# Patient Record
Sex: MALE | Race: WHITE | NOT HISPANIC OR LATINO | Employment: FULL TIME | URBAN - METROPOLITAN AREA
[De-identification: names, ages, dates, MRNs, and addresses within clinical notes are randomized per-mention and may not be internally consistent; named-entity substitution may affect disease eponyms.]

---

## 2017-01-25 ENCOUNTER — TELEPHONE (OUTPATIENT)
Dept: MEDICAL GROUP | Facility: PHYSICIAN GROUP | Age: 47
End: 2017-01-25

## 2017-01-25 NOTE — TELEPHONE ENCOUNTER
Pt would like to get this medication refilled I told him about having to get labs done and he said he did with work and said he got them done at [facility]. Also has a problem with an allergy med. Please call him

## 2017-01-26 NOTE — TELEPHONE ENCOUNTER
Phone Number Called: [phone]     Message: Left message for patient to call us back about his medication.     Left Message for patient to call back: yes and no

## 2017-02-27 ENCOUNTER — OFFICE VISIT (OUTPATIENT)
Dept: MEDICAL GROUP | Facility: MEDICAL CENTER | Age: 47
End: 2017-02-27
Payer: COMMERCIAL

## 2017-02-27 VITALS
HEIGHT: 74 IN | DIASTOLIC BLOOD PRESSURE: 89 MMHG | TEMPERATURE: 98.2 F | WEIGHT: 198 LBS | BODY MASS INDEX: 25.41 KG/M2 | OXYGEN SATURATION: 98 % | SYSTOLIC BLOOD PRESSURE: 134 MMHG | HEART RATE: 89 BPM | RESPIRATION RATE: 16 BRPM

## 2017-02-27 DIAGNOSIS — E78.5 HYPERLIPIDEMIA, UNSPECIFIED HYPERLIPIDEMIA TYPE: ICD-10-CM

## 2017-02-27 DIAGNOSIS — J30.89 ALLERGIC RHINITIS DUE TO OTHER ALLERGIC TRIGGER, UNSPECIFIED RHINITIS SEASONALITY: ICD-10-CM

## 2017-02-27 DIAGNOSIS — Z23 NEED FOR PERTUSSIS VACCINATION: ICD-10-CM

## 2017-02-27 PROBLEM — J30.9 ALLERGIC RHINITIS DUE TO ALLERGEN: Status: ACTIVE | Noted: 2017-02-27

## 2017-02-27 PROCEDURE — 90715 TDAP VACCINE 7 YRS/> IM: CPT | Performed by: PHYSICIAN ASSISTANT

## 2017-02-27 PROCEDURE — 90471 IMMUNIZATION ADMIN: CPT | Performed by: PHYSICIAN ASSISTANT

## 2017-02-27 PROCEDURE — 99214 OFFICE O/P EST MOD 30 MIN: CPT | Mod: 25 | Performed by: PHYSICIAN ASSISTANT

## 2017-02-27 RX ORDER — SIMVASTATIN 10 MG
TABLET ORAL
Qty: 90 TAB | Refills: 1 | Status: SHIPPED | OUTPATIENT
Start: 2017-02-27 | End: 2017-02-27 | Stop reason: SDUPTHER

## 2017-02-27 RX ORDER — TRIAMCINOLONE ACETONIDE 40 MG/ML
40 INJECTION, SUSPENSION INTRA-ARTICULAR; INTRAMUSCULAR ONCE
Qty: 1 ML | Refills: 0 | Status: SHIPPED | OUTPATIENT
Start: 2017-02-27 | End: 2017-02-28 | Stop reason: SDUPTHER

## 2017-02-27 RX ORDER — SIMVASTATIN 10 MG
TABLET ORAL
Qty: 90 TAB | Refills: 1 | Status: SHIPPED | OUTPATIENT
Start: 2017-02-27 | End: 2017-02-28 | Stop reason: SDUPTHER

## 2017-02-27 RX ORDER — FEXOFENADINE HCL 180 MG/1
180 TABLET ORAL DAILY
Qty: 90 TAB | Refills: 1 | Status: SHIPPED | OUTPATIENT
Start: 2017-02-27 | End: 2022-01-17

## 2017-02-27 ASSESSMENT — PATIENT HEALTH QUESTIONNAIRE - PHQ9: CLINICAL INTERPRETATION OF PHQ2 SCORE: 0

## 2017-02-27 NOTE — PROGRESS NOTES
Chief Complaint   Patient presents with   • Follow-Up       HISTORY OF PRESENT ILLNESS: Patient is a 46 y.o. male established patient who presents today to follow-up    Hyperlipidemia  Chronic in nature. Patient currently taking simvastatin 10 mg daily basis. Patient recently had labs drawn in December 2016 for his place of employment. Patient states that labs are significantly better I have reviewed labs with patient today which does show improved LDL, HDL, triglycerides and total clitoral. Patient very impressed with lab results, which is down medication. States no adverse side effects to medication.    Allergic rhinitis due to allergen  Chronic in nature. Patient states was doing well on Claritin 10 mg daily. Patient states build-up tolerance. Requesting new medication. In discussion with patient and patient requesting Kenalog injection. Patient states he wishes to self administer. Patient states he will return to clinic if having troubles administering medication.    Need for pertussis vaccination  Requesting Tdap vaccination. Patient states he was expecting twins in the near future and does need such injection prior to twins being born. Patient states asymptomatic currently and no content indications when patient cannot receive vaccination in office.       Patient Active Problem List    Diagnosis Date Noted   • Hyperlipidemia 02/27/2017   • Allergic rhinitis due to allergen 02/27/2017   • Need for pertussis vaccination 02/27/2017       Allergies:Review of patient's allergies indicates no known allergies.    Current Outpatient Prescriptions   Medication Sig Dispense Refill   • simvastatin (ZOCOR) 10 MG Tab TAKE 1 TAB BY MOUTH EVERY EVENING. 90 Tab 1   • fexofenadine (ALLEGRA) 180 MG tablet Take 1 Tab by mouth every day. 90 Tab 1   • triamcinolone acetonide (KENALOG-40) 40 MG/ML Suspension 1 mL by Intramuscular route Once for 1 dose. 1 mL 0   • loratadine (CLARITIN) 10 MG Tab TAKE 1 TAB BY MOUTH EVERY DAY. **INS.  "REJECTED 90 DAYS 30 Tab 5   • fluticasone (FLONASE) 50 MCG/ACT nasal spray Spray 1 Spray in nose 2 times a day. 16 g 2     No current facility-administered medications for this visit.       Social History   Substance Use Topics   • Smoking status: Current Every Day Smoker -- 0.25 packs/day     Types: Cigars   • Smokeless tobacco: Never Used   • Alcohol Use: No       Family Status   Relation Status Death Age   • Mother       Cancer     Family History   Problem Relation Age of Onset   • Cancer Mother      unknown area   • Heart Disease Father        Review of Systems:   Constitutional: Negative for fever, chills, weight loss and malaise/fatigue.   HENT: Negative for ear pain, nosebleeds.  Positive nasal congestion.  Negative sore throat and neck pain.    Eyes: Negative for blurred vision.   Respiratory: Negative for cough, sputum production, shortness of breath and wheezing.    Cardiovascular: Negative for chest pain, palpitations, orthopnea and leg swelling.   Gastrointestinal: Negative for heartburn, nausea, vomiting and abdominal pain.   Genitourinary: Negative for dysuria, urgency and frequency.   Musculoskeletal: Negative for myalgias, back pain and joint pain.   Skin: Negative for rash and itching.   Neurological: Negative for dizziness, tingling, tremors, sensory change, focal weakness and headaches.   Endo/Heme/Allergies: Does not bruise/bleed easily.   Psychiatric/Behavioral: Negative for depression, suicidal ideas and memory loss.  The patient is not nervous/anxious and does not have insomnia.    All other systems reviewed and are negative except as in HPI.    Exam:  Blood pressure 134/89, pulse 89, temperature 36.8 °C (98.2 °F), resp. rate 16, height 1.88 m (6' 2.02\"), weight 89.812 kg (198 lb), SpO2 98 %.  General:  Well nourished, well developed male in NAD  Head: is grossly normal.  Neck: Supple   Pulmonary: No respiratory distress  Extremities: no clubbing, cyanosis, or edema.    Medical " decision-making and discussion: I reviewed the patient's labs that he does bring in today. Show improvement. We will continue on simvastatin 10 mg. 90 day supply with one additional refill has been ordered. We have changed out patient's Claritin to Allegra. Also written for patient triamcinolone intramuscular injection.    Please note that this dictation was created using voice recognition software. I have made every reasonable attempt to correct obvious errors, but I expect that there are errors of grammar and possibly content that I did not discover before finalizing the note.    Assessment/Plan:  1. Hyperlipidemia, unspecified hyperlipidemia type  simvastatin (ZOCOR) 10 MG Tab    DISCONTINUED: simvastatin (ZOCOR) 10 MG Tab    improved.  stay on current dose   2. Need for pertussis vaccination  TDAP VACCINE =>6YO IM   3. Allergic rhinitis due to other allergic trigger, unspecified rhinitis seasonality  fexofenadine (ALLEGRA) 180 MG tablet    triamcinolone acetonide (KENALOG-40) 40 MG/ML Suspension

## 2017-02-27 NOTE — ASSESSMENT & PLAN NOTE
Requesting Tdap vaccination. Patient states he was expecting twins in the near future and does need such injection prior to twins being born. Patient states asymptomatic currently and no content indications when patient cannot receive vaccination in office.

## 2017-02-27 NOTE — ASSESSMENT & PLAN NOTE
Chronic in nature. Patient currently taking simvastatin 10 mg daily basis. Patient recently had labs drawn in December 2016 for his place of employment. Patient states that labs are significantly better I have reviewed labs with patient today which does show improved LDL, HDL, triglycerides and total clitoral. Patient very impressed with lab results, which is down medication. States no adverse side effects to medication.

## 2017-02-27 NOTE — ASSESSMENT & PLAN NOTE
Chronic in nature. Patient states was doing well on Claritin 10 mg daily. Patient states build-up tolerance. Requesting new medication. In discussion with patient and patient requesting Kenalog injection. Patient states he wishes to self administer. Patient states he will return to clinic if having troubles administering medication.

## 2017-02-28 ENCOUNTER — TELEPHONE (OUTPATIENT)
Dept: MEDICAL GROUP | Facility: MEDICAL CENTER | Age: 47
End: 2017-02-28

## 2017-02-28 DIAGNOSIS — E78.5 HYPERLIPIDEMIA, UNSPECIFIED HYPERLIPIDEMIA TYPE: ICD-10-CM

## 2017-02-28 DIAGNOSIS — J30.89 ALLERGIC RHINITIS DUE TO OTHER ALLERGIC TRIGGER, UNSPECIFIED RHINITIS SEASONALITY: ICD-10-CM

## 2017-02-28 RX ORDER — SIMVASTATIN 10 MG
TABLET ORAL
Qty: 90 TAB | Refills: 1 | Status: SHIPPED | OUTPATIENT
Start: 2017-02-28 | End: 2017-09-12 | Stop reason: SDUPTHER

## 2017-02-28 RX ORDER — TRIAMCINOLONE ACETONIDE 40 MG/ML
40 INJECTION, SUSPENSION INTRA-ARTICULAR; INTRAMUSCULAR ONCE
Qty: 1 ML | Refills: 0 | Status: SHIPPED | OUTPATIENT
Start: 2017-02-28 | End: 2017-02-28

## 2017-02-28 NOTE — TELEPHONE ENCOUNTER
1. Caller Name: Sebastian                      Call Back Number: [phone]     2. Message: Sebastian called stating that his simvastatin (ZOCOR) 10 MG Tab cost $50.00 a month and that is to much money for him to spend. He would like if you could give him a different medication that does not cost as much.     Please let me know and I will call Sebastian back.     Thank you.     3. Patient approves office to leave a detailed voicemail/MyChart message: yes

## 2017-06-29 DIAGNOSIS — J30.2 SEASONAL ALLERGIC RHINITIS, UNSPECIFIED ALLERGIC RHINITIS TRIGGER: ICD-10-CM

## 2017-06-30 NOTE — TELEPHONE ENCOUNTER
Was the patient seen in the last year in this department? Yes     Does patient have an active prescription for medications requested? No     Received Request Via: Pharmacy     Last seen 02/27/2017  Labs  03/21/2016

## 2017-07-03 RX ORDER — LORATADINE 10 MG/1
TABLET ORAL
Qty: 30 TAB | Refills: 5 | Status: SHIPPED | OUTPATIENT
Start: 2017-07-03 | End: 2018-01-07 | Stop reason: SDUPTHER

## 2017-07-11 RX ORDER — FLUTICASONE PROPIONATE 50 MCG
SPRAY, SUSPENSION (ML) NASAL
Qty: 1 BOTTLE | Refills: 2 | Status: SHIPPED | OUTPATIENT
Start: 2017-07-11 | End: 2022-01-17

## 2017-07-11 NOTE — TELEPHONE ENCOUNTER
Was the patient seen in the last year in this department? Yes     Does patient have an active prescription for medications requested? Yes     Received Request Via: Pharmacy     Last office visit: 03/16/2016  Last labs: 02/27/2017

## 2017-09-12 DIAGNOSIS — E78.5 HYPERLIPIDEMIA, UNSPECIFIED HYPERLIPIDEMIA TYPE: ICD-10-CM

## 2017-09-12 RX ORDER — SIMVASTATIN 10 MG
TABLET ORAL
Qty: 90 TAB | Refills: 0 | Status: SHIPPED | OUTPATIENT
Start: 2017-09-12 | End: 2018-03-17 | Stop reason: SDUPTHER

## 2017-10-16 ENCOUNTER — APPOINTMENT (OUTPATIENT)
Dept: SOCIAL WORK | Facility: CLINIC | Age: 47
End: 2017-10-16
Payer: COMMERCIAL

## 2017-10-16 PROCEDURE — 90471 IMMUNIZATION ADMIN: CPT | Performed by: REGISTERED NURSE

## 2017-10-16 PROCEDURE — 90686 IIV4 VACC NO PRSV 0.5 ML IM: CPT | Performed by: REGISTERED NURSE

## 2018-01-07 DIAGNOSIS — J30.2 SEASONAL ALLERGIC RHINITIS: ICD-10-CM

## 2018-01-08 RX ORDER — LORATADINE 10 MG/1
TABLET ORAL
Qty: 90 TAB | Refills: 0 | Status: SHIPPED | OUTPATIENT
Start: 2018-01-08 | End: 2022-01-17

## 2018-03-17 DIAGNOSIS — E78.5 HYPERLIPIDEMIA, UNSPECIFIED HYPERLIPIDEMIA TYPE: ICD-10-CM

## 2018-03-22 RX ORDER — SIMVASTATIN 10 MG
TABLET ORAL
Qty: 90 TAB | Refills: 0 | Status: SHIPPED | OUTPATIENT
Start: 2018-03-22 | End: 2018-09-17 | Stop reason: SDUPTHER

## 2018-09-17 ENCOUNTER — OFFICE VISIT (OUTPATIENT)
Dept: MEDICAL GROUP | Facility: PHYSICIAN GROUP | Age: 48
End: 2018-09-17
Payer: COMMERCIAL

## 2018-09-17 VITALS
WEIGHT: 195 LBS | RESPIRATION RATE: 14 BRPM | OXYGEN SATURATION: 94 % | HEART RATE: 83 BPM | TEMPERATURE: 97.3 F | DIASTOLIC BLOOD PRESSURE: 60 MMHG | BODY MASS INDEX: 25.03 KG/M2 | SYSTOLIC BLOOD PRESSURE: 104 MMHG | HEIGHT: 74 IN

## 2018-09-17 DIAGNOSIS — E78.5 HYPERLIPIDEMIA, UNSPECIFIED HYPERLIPIDEMIA TYPE: ICD-10-CM

## 2018-09-17 PROCEDURE — 99214 OFFICE O/P EST MOD 30 MIN: CPT | Performed by: FAMILY MEDICINE

## 2018-09-17 RX ORDER — SIMVASTATIN 10 MG
10 TABLET ORAL
Qty: 90 TAB | Refills: 3 | Status: SHIPPED | OUTPATIENT
Start: 2018-09-17 | End: 2019-09-16 | Stop reason: SDUPTHER

## 2018-09-17 RX ORDER — SIMVASTATIN 10 MG
10 TABLET ORAL
Qty: 90 TAB | Refills: 1 | Status: CANCELLED | OUTPATIENT
Start: 2018-09-17

## 2018-09-17 ASSESSMENT — ENCOUNTER SYMPTOMS
PALPITATIONS: 0
CONSTITUTIONAL NEGATIVE: 1
MYALGIAS: 0
DIZZINESS: 0
EYES NEGATIVE: 1
COUGH: 0
CHILLS: 0
MUSCULOSKELETAL NEGATIVE: 1
HEARTBURN: 0
DEPRESSION: 0
NEUROLOGICAL NEGATIVE: 1
BRUISES/BLEEDS EASILY: 0
DOUBLE VISION: 0
GASTROINTESTINAL NEGATIVE: 1
HEMOPTYSIS: 0
RESPIRATORY NEGATIVE: 1
CARDIOVASCULAR NEGATIVE: 1
FEVER: 0
NAUSEA: 0
BLURRED VISION: 0
HEADACHES: 0
PSYCHIATRIC NEGATIVE: 1
TINGLING: 0

## 2018-09-17 NOTE — PROGRESS NOTES
Subjective:      Sebastian Asif is a 47 y.o. male who presents with Hyperlipidemia            1. Hyperlipidemia, unspecified hyperlipidemia type  Currently treated for HLD, taking meds with no new myalgias or joint pain, watching fats in diet  controlled  meds refilled today will get us his labs next month from [employer]  - simvastatin (ZOCOR) 10 MG Tab; Take 1 Tab by mouth every bedtime. TAKE 1 TAB BY MOUTH EVERY EVENING.  Dispense: 90 Tab; Refill: 3    Past Medical History:  2014: Elevated cholesterol      Comment:  was taking fish oil.  last labs 5 months ago  Past Surgical History:  No date: KNEE ARTHROSCOPY; Left  No date: OTHER      Comment:  TEste procedure for blockage repair  Smoking status: Current Every Day Smoker                                                   Packs/day: 0.25      Years: 0.00         Types: Cigars  Smokeless tobacco: Never Used                      Alcohol use: No              Review of patient's family history indicates:  Problem: Cancer      Relation: Mother       Age of Onset: (Not Specified)       Comment: unknown area  Problem: Heart Disease      Relation: Father       Age of Onset: (Not Specified)       Current Outpatient Prescriptions: •  simvastatin (ZOCOR) 10 MG Tab, Take 1 Tab by mouth every bedtime. TAKE 1 TAB BY MOUTH EVERY EVENING., Disp: 90 Tab, Rfl: 3•  loratadine (CLARITIN) 10 MG Tab, TAKE 1 TAB BY MOUTH EVERY DAY. **INS NOT COVERED, Disp: 90 Tab, Rfl: 0•  fluticasone (FLONASE) 50 MCG/ACT nasal spray, INSTILL ONE SPRAY IN NOSE 2 TIMES A DAY., Disp: 1 Bottle, Rfl: 2•  fexofenadine (ALLEGRA) 180 MG tablet, Take 1 Tab by mouth every day., Disp: 90 Tab, Rfl: 1    Patient was instructed on the use of medications, either prescriptions or OTC and informed on when the appropriate follow up time period should be. In addition, patient was also instructed that should any acute worsening occur that they should notify this clinic asap or call  "911.            Review of Systems   Constitutional: Negative.  Negative for chills and fever.   HENT: Negative.  Negative for hearing loss.    Eyes: Negative.  Negative for blurred vision and double vision.   Respiratory: Negative.  Negative for cough and hemoptysis.    Cardiovascular: Negative.  Negative for chest pain and palpitations.   Gastrointestinal: Negative.  Negative for heartburn and nausea.   Genitourinary: Negative.  Negative for dysuria.   Musculoskeletal: Negative.  Negative for myalgias.   Skin: Negative.  Negative for rash.   Neurological: Negative.  Negative for dizziness, tingling and headaches.   Endo/Heme/Allergies: Negative.  Does not bruise/bleed easily.   Psychiatric/Behavioral: Negative.  Negative for depression and suicidal ideas.   All other systems reviewed and are negative.         Objective:     /60   Pulse 83   Temp 36.3 °C (97.3 °F)   Resp 14   Ht 1.88 m (6' 2\") Comment: PATIENT REPORTED  Wt 88.5 kg (195 lb) Comment: PATIENT REPORTED  SpO2 94%   BMI 25.04 kg/m²      Physical Exam   Constitutional: He is oriented to person, place, and time. He appears well-developed and well-nourished. No distress.   HENT:   Head: Normocephalic and atraumatic.   Mouth/Throat: Oropharynx is clear and moist. No oropharyngeal exudate.   Eyes: Pupils are equal, round, and reactive to light.   Cardiovascular: Normal rate, regular rhythm, normal heart sounds and intact distal pulses.  Exam reveals no gallop and no friction rub.    No murmur heard.  Pulmonary/Chest: Effort normal and breath sounds normal. No respiratory distress. He has no wheezes. He has no rales. He exhibits no tenderness.   Neurological: He is alert and oriented to person, place, and time.   Skin: He is not diaphoretic.   Psychiatric: He has a normal mood and affect. His behavior is normal. Judgment and thought content normal.   Nursing note and vitals reviewed.              Assessment/Plan:     1. Hyperlipidemia, unspecified " hyperlipidemia type    - simvastatin (ZOCOR) 10 MG Tab; Take 1 Tab by mouth every bedtime. TAKE 1 TAB BY MOUTH EVERY EVENING.  Dispense: 90 Tab; Refill: 3

## 2019-02-28 ENCOUNTER — NON-PROVIDER VISIT (OUTPATIENT)
Dept: MEDICAL GROUP | Facility: CLINIC | Age: 49
End: 2019-02-28

## 2019-02-28 DIAGNOSIS — Z02.83 ENCOUNTER FOR DRUG SCREENING: ICD-10-CM

## 2019-02-28 PROCEDURE — 8907 PR URINE COLLECT ONLY: Performed by: FAMILY MEDICINE

## 2019-09-16 ENCOUNTER — OFFICE VISIT (OUTPATIENT)
Dept: MEDICAL GROUP | Facility: PHYSICIAN GROUP | Age: 49
End: 2019-09-16
Payer: COMMERCIAL

## 2019-09-16 VITALS
RESPIRATION RATE: 20 BRPM | TEMPERATURE: 98.6 F | WEIGHT: 195 LBS | OXYGEN SATURATION: 93 % | SYSTOLIC BLOOD PRESSURE: 138 MMHG | HEIGHT: 72 IN | HEART RATE: 73 BPM | BODY MASS INDEX: 26.41 KG/M2 | DIASTOLIC BLOOD PRESSURE: 88 MMHG

## 2019-09-16 DIAGNOSIS — E78.5 HYPERLIPIDEMIA, UNSPECIFIED HYPERLIPIDEMIA TYPE: ICD-10-CM

## 2019-09-16 DIAGNOSIS — J30.2 SEASONAL ALLERGIC RHINITIS, UNSPECIFIED TRIGGER: ICD-10-CM

## 2019-09-16 DIAGNOSIS — Z00.00 WELL ADULT EXAM: ICD-10-CM

## 2019-09-16 PROCEDURE — 99396 PREV VISIT EST AGE 40-64: CPT | Performed by: FAMILY MEDICINE

## 2019-09-16 RX ORDER — SIMVASTATIN 10 MG
10 TABLET ORAL
Qty: 90 TAB | Refills: 3 | Status: SHIPPED | OUTPATIENT
Start: 2019-09-16 | End: 2020-12-02

## 2019-09-16 ASSESSMENT — ENCOUNTER SYMPTOMS
CARDIOVASCULAR NEGATIVE: 1
PALPITATIONS: 0
BRUISES/BLEEDS EASILY: 0
DIZZINESS: 0
PSYCHIATRIC NEGATIVE: 1
GASTROINTESTINAL NEGATIVE: 1
SINUS PAIN: 1
MYALGIAS: 0
HEADACHES: 0
DOUBLE VISION: 0
NEUROLOGICAL NEGATIVE: 1
HEMOPTYSIS: 0
EYES NEGATIVE: 1
MUSCULOSKELETAL NEGATIVE: 1
BLURRED VISION: 0
HEARTBURN: 0
DEPRESSION: 0
CONSTITUTIONAL NEGATIVE: 1
NAUSEA: 0
SORE THROAT: 1
RESPIRATORY NEGATIVE: 1
COUGH: 0
TINGLING: 0
CHILLS: 0
FEVER: 0

## 2019-09-16 ASSESSMENT — PATIENT HEALTH QUESTIONNAIRE - PHQ9: CLINICAL INTERPRETATION OF PHQ2 SCORE: 0

## 2019-09-17 NOTE — PROGRESS NOTES
Subjective:      Bronson Asif is a 48 y.o. male who presents with Annual Exam            1. Well adult exam  Here to go over labs,  ldl at [facility] and normal blood sugar  bmi normal    2. Hyperlipidemia, unspecified hyperlipidemia type  Currently treated for HLD, taking meds with no new myalgias or joint pain, watching fats in diet  controlled    - simvastatin (ZOCOR) 10 MG Tab; Take 1 Tab by mouth every bedtime. TAKE 1 TAB BY MOUTH EVERY EVENING.  Dispense: 90 Tab; Refill: 3    3. Seasonal allergic rhinitis, unspecified trigger  Stuffy nose and wheezing at times seems to be worse in winter and better in summer, was taking flonase but stopped due to nose bleeding  Also gets sinus infections during the winter that are hard to shake off  Will get allergy eval for triggers  - REFERRAL TO ALLERGY    Past Medical History:  2014: Elevated cholesterol      Comment:  was taking fish oil.  last labs 5 months ago  Past Surgical History:  No date: KNEE ARTHROSCOPY; Left  No date: OTHER      Comment:  TEste procedure for blockage repair  Social History    Tobacco Use      Smoking status: Former Smoker        Types: Cigars      Smokeless tobacco: Never Used    Alcohol use: No      Alcohol/week: 0.0 oz    Drug use: No    Review of patient's family history indicates:  Problem: Cancer      Relation: Mother          Age of Onset: (Not Specified)          Comment: unknown area  Problem: Heart Disease      Relation: Father          Age of Onset: (Not Specified)      Current Outpatient Medications: •  simvastatin (ZOCOR) 10 MG Tab, Take 1 Tab by mouth every bedtime. TAKE 1 TAB BY MOUTH EVERY EVENING., Disp: 90 Tab, Rfl: 3•  loratadine (CLARITIN) 10 MG Tab, TAKE 1 TAB BY MOUTH EVERY DAY. **INS NOT COVERED, Disp: 90 Tab, Rfl: 0•  fluticasone (FLONASE) 50 MCG/ACT nasal spray, INSTILL ONE SPRAY IN NOSE 2 TIMES A DAY., Disp: 1 Bottle, Rfl: 2•  fexofenadine (ALLEGRA) 180 MG tablet, Take 1 Tab by mouth every day., Disp: 90 Tab, Rfl:  1    Patient was instructed on the use of medications, either prescriptions or OTC and informed on when the appropriate follow up time period should be. In addition, patient was also instructed that should any acute worsening occur that they should notify this clinic asap or call 911.          Review of Systems   Constitutional: Negative.  Negative for chills and fever.   HENT: Positive for congestion, ear discharge, nosebleeds, sinus pain and sore throat. Negative for hearing loss.    Eyes: Negative.  Negative for blurred vision and double vision.   Respiratory: Negative.  Negative for cough and hemoptysis.    Cardiovascular: Negative.  Negative for chest pain and palpitations.   Gastrointestinal: Negative.  Negative for heartburn and nausea.   Genitourinary: Negative.  Negative for dysuria.   Musculoskeletal: Negative.  Negative for myalgias.   Skin: Negative.  Negative for rash.   Neurological: Negative.  Negative for dizziness, tingling and headaches.   Endo/Heme/Allergies: Negative.  Does not bruise/bleed easily.   Psychiatric/Behavioral: Negative.  Negative for depression and suicidal ideas.   All other systems reviewed and are negative.         Objective:     /88   Pulse 73   Temp 37 °C (98.6 °F)   Resp 20   Ht 1.829 m (6')   Wt 88.5 kg (195 lb)   SpO2 93%   BMI 26.45 kg/m²      Physical Exam   Constitutional: He is oriented to person, place, and time. He appears well-developed and well-nourished. No distress.   HENT:   Head: Normocephalic and atraumatic.   Nose: Mucosal edema and rhinorrhea present.   Mouth/Throat: Oropharynx is clear and moist. No oropharyngeal exudate.   Eyes: Pupils are equal, round, and reactive to light.   Cardiovascular: Normal rate, regular rhythm, normal heart sounds and intact distal pulses. Exam reveals no gallop and no friction rub.   No murmur heard.  Pulmonary/Chest: Effort normal and breath sounds normal. No respiratory distress. He has no wheezes. He has no rales.  He exhibits no tenderness.   Neurological: He is alert and oriented to person, place, and time.   Skin: He is not diaphoretic.   Psychiatric: He has a normal mood and affect. His behavior is normal. Judgment and thought content normal.   Nursing note and vitals reviewed.              Assessment/Plan:     1. Well adult exam      2. Hyperlipidemia, unspecified hyperlipidemia type    - simvastatin (ZOCOR) 10 MG Tab; Take 1 Tab by mouth every bedtime. TAKE 1 TAB BY MOUTH EVERY EVENING.  Dispense: 90 Tab; Refill: 3    3. Seasonal allergic rhinitis, unspecified trigger    - REFERRAL TO ALLERGY

## 2020-05-08 ENCOUNTER — TELEPHONE (OUTPATIENT)
Dept: MEDICAL GROUP | Facility: PHYSICIAN GROUP | Age: 50
End: 2020-05-08

## 2020-05-08 ENCOUNTER — TELEPHONE (OUTPATIENT)
Dept: HEALTH INFORMATION MANAGEMENT | Facility: OTHER | Age: 50
End: 2020-05-08

## 2020-05-08 NOTE — TELEPHONE ENCOUNTER
1. Caller Name: Bronson Cherry                       Call Back Number: [phone]  [facility] PCP or Specialty Provider: Yes Dr. Isreal Mcknight        2.  Does patient have any active symptoms of respiratory illness? Yes, the patient reports the following respiratory symptoms: cough and shortness of breath or difficulty breathing. as cough and SOB which is seasonal and is his baseline this time of year.  The patient also endorses chest tightness which is new about a week to 2 weeks.  No radiation, rate about 5/10, states no pain just tightness.  Recommend him to go to ED he was not in agreement.    3.  Does patient have any comoribidities? Immunosuppressed Uses steroid inhalers    4.  Has the patient traveled in the last 14 days OR had any known contact with someone who is suspected or confirmed to have COVID-19?  No.    5. Disposition: Advised to go to ED or call 9-1-1 Consulted with Dr. Mimi Mcnair who also recommended ED and also to send a note over to Dr. Mcknight.  The patient was transferred over to  with request to have telephone visit with Dr. Mcknight      Note routed to [facility] Provider: Provider action needed:

## 2020-05-08 NOTE — TELEPHONE ENCOUNTER
"Patient states that he coughs after finishing a cigarette/cigars and does not have a chronic cough.  He states that he is concerned about his blood pressure and the tightness in his chest, he has noticed that his blood pressure is higher than normal for him (135/90) and that he is experiencing new anxiety symptoms which he feels is contributing to the tightness in his chest. The main reason for his visit is to have his BP checked by an MD.     I notified patient that due to his symptoms it would be best for him to go to ER to be evaluated, the patient declined this suggestion saying that it would be too expensive for him with his insurance.  I then suggested that he could visit the Urgent Care here in [place], he also denied that.      Notified patient that with an active cough we would be unable to see him physically in the office and that we could change his appointment to a virtual visit, patient declined that offer and said that \"the doctor would not be able to do an EKG or BP reading for him over the phone\".       "

## 2020-05-09 NOTE — TELEPHONE ENCOUNTER
Spoke with patient again and let him know that due to his cough we would be unable to see him physically in the office.   Again offered patient the option to be seen with a virtual visit, patient declined.     I then suggest to patient that he go and be seen at our [facility] here in [place], patient stated that he will go to the  to be evaluated.     Declined the offer to have me reschedule an appointment with Dr. Mcknight in 2 weeks.      Provided patient the address and hours of UC, patient expressed understanding.

## 2020-05-10 ENCOUNTER — APPOINTMENT (OUTPATIENT)
Dept: RADIOLOGY | Facility: IMAGING CENTER | Age: 50
End: 2020-05-10
Attending: PHYSICIAN ASSISTANT
Payer: COMMERCIAL

## 2020-05-10 ENCOUNTER — OFFICE VISIT (OUTPATIENT)
Dept: URGENT CARE | Facility: CLINIC | Age: 50
End: 2020-05-10
Payer: COMMERCIAL

## 2020-05-10 VITALS
RESPIRATION RATE: 14 BRPM | OXYGEN SATURATION: 95 % | DIASTOLIC BLOOD PRESSURE: 78 MMHG | HEART RATE: 91 BPM | WEIGHT: 196 LBS | BODY MASS INDEX: 26.55 KG/M2 | HEIGHT: 72 IN | SYSTOLIC BLOOD PRESSURE: 126 MMHG | TEMPERATURE: 98.7 F

## 2020-05-10 DIAGNOSIS — R07.89 CHEST TIGHTNESS: ICD-10-CM

## 2020-05-10 PROCEDURE — 99203 OFFICE O/P NEW LOW 30 MIN: CPT | Performed by: PHYSICIAN ASSISTANT

## 2020-05-10 PROCEDURE — 71046 X-RAY EXAM CHEST 2 VIEWS: CPT | Mod: TC | Performed by: PHYSICIAN ASSISTANT

## 2020-05-10 RX ORDER — ALBUTEROL SULFATE 90 UG/1
2 AEROSOL, METERED RESPIRATORY (INHALATION) EVERY 6 HOURS PRN
COMMUNITY

## 2020-05-10 ASSESSMENT — ENCOUNTER SYMPTOMS
SHORTNESS OF BREATH: 0
LOWER EXTREMITY EDEMA: 0
NAUSEA: 0
SORE THROAT: 0
EYE DISCHARGE: 0
VOMITING: 0
DIZZINESS: 0
WHEEZING: 0
PALPITATIONS: 0
MYALGIAS: 0
IRREGULAR HEARTBEAT: 0
EYE REDNESS: 0
FEVER: 0
HEADACHES: 0
COUGH: 0

## 2020-05-10 NOTE — PROGRESS NOTES
Subjective:      Bronson Asif is a 49 y.o. male who presents with Chest Pain (chest tightness)        The patient presents to clinic complaining of intermittent chest pain x2-3 weeks.  The patient states his chest pain is located to the stump sternal region.  He describes the pain as tightness.  The patient states the chest pain is intermittent, and most notable in the afternoon/evening.  He reports no radiation of pain.  No cough.  No shortness of breath.  No swelling of his lower extremities.  No nausea.  No palpitations.  No aggravating/alleviating factors.  The patient is not taken anything for his current symptoms.  He reports no recent or prolonged travel/sitting.  He is a current smoker.    Chest Pain    This is a new problem. Episode onset: x 2-3 weeks. The problem occurs intermittently. The problem has been unchanged. The pain is present in the substernal region. The quality of the pain is described as tightness. The pain does not radiate. Pertinent negatives include no cough, dizziness, fever, headaches, irregular heartbeat, lower extremity edema, nausea, palpitations, shortness of breath or vomiting. The pain is aggravated by nothing. He has tried nothing for the symptoms.     PMH:  has a past medical history of Elevated cholesterol (2014).  MEDS:   Current Outpatient Medications:   •  albuterol 108 (90 Base) MCG/ACT Aero Soln inhalation aerosol, Inhale 2 Puffs by mouth every 6 hours as needed for Shortness of Breath., Disp: , Rfl:   •  simvastatin (ZOCOR) 10 MG Tab, Take 1 Tab by mouth every bedtime. TAKE 1 TAB BY MOUTH EVERY EVENING., Disp: 90 Tab, Rfl: 3  •  loratadine (CLARITIN) 10 MG Tab, TAKE 1 TAB BY MOUTH EVERY DAY. **INS NOT COVERED, Disp: 90 Tab, Rfl: 0  •  fluticasone (FLONASE) 50 MCG/ACT nasal spray, INSTILL ONE SPRAY IN NOSE 2 TIMES A DAY., Disp: 1 Bottle, Rfl: 2  •  fexofenadine (ALLEGRA) 180 MG tablet, Take 1 Tab by mouth every day., Disp: 90 Tab, Rfl: 1  ALLERGIES: No Known  Allergies  SURGHX:   Past Surgical History:   Procedure Laterality Date   • KNEE ARTHROSCOPY Left    • OTHER      TEste procedure for blockage repair     SOCHX:  reports that he has been smoking cigars. He has never used smokeless tobacco. He reports that he does not drink alcohol or use drugs.  FH: Family history was reviewed, no pertinent findings to report    Review of Systems   Constitutional: Negative for fever.   HENT: Negative for congestion, ear pain and sore throat.    Eyes: Negative for discharge and redness.   Respiratory: Negative for cough, shortness of breath and wheezing.    Cardiovascular: Positive for chest pain. Negative for palpitations and leg swelling.   Gastrointestinal: Negative for nausea and vomiting.   Musculoskeletal: Negative for joint pain and myalgias.   Skin: Negative for rash.   Neurological: Negative for dizziness and headaches.   All other systems reviewed and are negative.         Objective:     /78 (BP Location: Right arm, Patient Position: Sitting)   Pulse 91   Temp 37.1 °C (98.7 °F)   Resp 14   Ht 1.829 m (6')   Wt 88.9 kg (196 lb)   SpO2 95%   BMI 26.58 kg/m²      Physical Exam  Constitutional:       General: He is not in acute distress.     Appearance: Normal appearance. He is not ill-appearing.   HENT:      Head: Normocephalic and atraumatic.      Right Ear: Tympanic membrane, ear canal and external ear normal.      Left Ear: Tympanic membrane, ear canal and external ear normal.      Nose: Nose normal.      Mouth/Throat:      Mouth: Mucous membranes are moist.      Pharynx: Oropharynx is clear. No posterior oropharyngeal erythema.   Eyes:      Extraocular Movements: Extraocular movements intact.      Conjunctiva/sclera: Conjunctivae normal.   Neck:      Musculoskeletal: Normal range of motion and neck supple.   Cardiovascular:      Rate and Rhythm: Normal rate and regular rhythm.      Heart sounds: Normal heart sounds.   Pulmonary:      Effort: Pulmonary effort  is normal. No respiratory distress.      Breath sounds: Normal breath sounds. No wheezing.   Musculoskeletal: Normal range of motion.      Right lower leg: No edema.      Left lower leg: No edema.   Skin:     General: Skin is warm and dry.   Neurological:      Mental Status: He is alert and oriented to person, place, and time.            Progress:  CXR:  XRs reviewed by me.   FINDINGS:     No pulmonary infiltrates or consolidations are noted.  No pleural effusions, no pneumothorax are appreciated.  Normal cardiopericardial silhouette.     IMPRESSION:  1. No active cardiopulmonary abnormalities are identified.    EKG Interpretation-HR is 76 there are no previous tracings available for comparison, normal sinus rhythm. No acute ST segment changes. Normal axis.      Assessment/Plan:     1. Chest tightness  - EKG  -- See scanned EKG in Media.   - DX-CHEST-2 VIEWS; Future  -  AMA/Refusal of Treatment    The patient's presenting symptoms and physical exam findings are consistent with chest tightness.  The patient's physical exam today in clinic was normal.  The patient's lungs were clear to auscultation without wheezing, and his pulse ox was within normal limits.  A chest x-ray was obtained to further evaluate the patient's symptoms.  The patient chest x-ray showed no active cardiopulmonary abnormalities.  An EKG was also obtained to evaluate the patient's chest tightness.  The patient's EKG today in clinic showed normal sinus rhythm with a heart rate of 76.  No acute ST segment changes were appreciated.  There was no prior EKG for comparison.  Informed the patient of the limitations of evaluating chest pain in urgent care.  Advised the patient an chest x-ray and EKG are only a portion of the required work-up to rule out possible cardiac etiology.  The patient verbalized understanding.  Based on the patient's presenting symptoms and physical exam findings, I recommend the patient be transferred to the [facility] ED for  further evaluation of his chest tightness.  The patient declined further evaluation at this time.  Advised the patient of the associated risks of not seeking further care for his current symptoms, including permanent morbidity and/or mortality.  The patient verbalized understanding.  He continues to decline further evaluation in the ED at this time.  The patient is alert and oriented, not intoxicated, and demonstrates appropriate decision-making capacity, and despite knowing the associated risks is choosing to leave clinic AGAINST MEDICAL ADVICE.  See scanned AMA form.  Recommend the patient follow-up with his PCP.  Discussed STRICT ED precautions with the patient, and he verbalized understanding.    Differential diagnoses, supportive care, and indications for immediate follow-up discussed with patient.   Instructed to return to clinic or nearest emergency department for any change in condition, further concerns, or worsening of symptoms.    Follow-up with PCP  Return to clinic or go to the ED if symptoms worsen or fail to improve, or the patient should develop worsening/increasing/persistent chest tightness, shortness of breath, wheezing, palpitations, dizziness, lower extremity swelling, cough, congestion, fever/chills, and/or any concerning symptoms.    Discussed plan with the patient, and he agrees to the above.    Please note that this dictation was created using voice recognition software. I have made every reasonable attempt to correct obvious errors, but I expect that there may be errors of grammar and possibly content that I did not discover before finalizing the note.

## 2020-05-12 ENCOUNTER — APPOINTMENT (OUTPATIENT)
Dept: MEDICAL GROUP | Facility: PHYSICIAN GROUP | Age: 50
End: 2020-05-12
Payer: COMMERCIAL

## 2020-05-12 ENCOUNTER — OFFICE VISIT (OUTPATIENT)
Dept: MEDICAL GROUP | Facility: PHYSICIAN GROUP | Age: 50
End: 2020-05-12
Payer: COMMERCIAL

## 2020-05-12 VITALS
TEMPERATURE: 98.6 F | SYSTOLIC BLOOD PRESSURE: 134 MMHG | DIASTOLIC BLOOD PRESSURE: 82 MMHG | BODY MASS INDEX: 26.55 KG/M2 | OXYGEN SATURATION: 97 % | HEART RATE: 92 BPM | HEIGHT: 72 IN | WEIGHT: 196 LBS | RESPIRATION RATE: 16 BRPM

## 2020-05-12 DIAGNOSIS — E78.2 MIXED HYPERLIPIDEMIA: ICD-10-CM

## 2020-05-12 DIAGNOSIS — R07.9 CHEST PAIN, UNSPECIFIED TYPE: ICD-10-CM

## 2020-05-12 DIAGNOSIS — R07.89 ATYPICAL CHEST PAIN: ICD-10-CM

## 2020-05-12 PROCEDURE — 99214 OFFICE O/P EST MOD 30 MIN: CPT | Performed by: FAMILY MEDICINE

## 2020-05-12 ASSESSMENT — ENCOUNTER SYMPTOMS
MUSCULOSKELETAL NEGATIVE: 1
RESPIRATORY NEGATIVE: 1
GASTROINTESTINAL NEGATIVE: 1
DEPRESSION: 0
EYES NEGATIVE: 1
BLURRED VISION: 0
CHILLS: 0
MYALGIAS: 0
BRUISES/BLEEDS EASILY: 0
PSYCHIATRIC NEGATIVE: 1
NAUSEA: 0
HEARTBURN: 0
TINGLING: 0
NEUROLOGICAL NEGATIVE: 1
DOUBLE VISION: 0
PALPITATIONS: 0
HEADACHES: 0
HEMOPTYSIS: 0
FEVER: 0
CONSTITUTIONAL NEGATIVE: 1
COUGH: 0
DIZZINESS: 0

## 2020-05-12 ASSESSMENT — PATIENT HEALTH QUESTIONNAIRE - PHQ9: CLINICAL INTERPRETATION OF PHQ2 SCORE: 0

## 2020-05-12 NOTE — PROGRESS NOTES
Subjective:      Bronson Asif is a 49 y.o. male who presents with Chest Pain (tightness, harder to exhale, was seen at , has had alot of stress)            1. Chest pain, unspecified type  Patient is a  who over the past month has noticed a tightness in his chest  It starts after he goes to work and then resolves at night  Does not seem to be related to exercise or exertion  Increase in stress at work and did witness an suicide recently  Went to  and negative ekg and cxr there  Will get stress test for eval of his heart and then if negative discuss other possible causes  - NM-CARDIAC STRESS TEST; Future  - Comp Metabolic Panel; Future  - CBC WITHOUT DIFFERENTIAL; Future    2. Atypical chest pain    - NM-CARDIAC STRESS TEST; Future  - Comp Metabolic Panel; Future  - CBC WITHOUT DIFFERENTIAL; Future    3. Mixed hyperlipidemia    - NM-CARDIAC STRESS TEST; Future  - Comp Metabolic Panel; Future  - CBC WITHOUT DIFFERENTIAL; Future    Past Medical History:  2014: Elevated cholesterol      Comment:  was taking fish oil.  last labs 5 months ago  Past Surgical History:  No date: KNEE ARTHROSCOPY; Left  No date: OTHER      Comment:  TEste procedure for blockage repair  Social History    Tobacco Use      Smoking status: Current Every Day Smoker        Types: Cigars      Smokeless tobacco: Never Used    Alcohol use: No      Alcohol/week: 0.0 oz    Drug use: No    Review of patient's family history indicates:  Problem: Cancer      Relation: Mother          Age of Onset: (Not Specified)          Comment: unknown area  Problem: Heart Disease      Relation: Father          Age of Onset: (Not Specified)      Current Outpatient Medications: •  albuterol 108 (90 Base) MCG/ACT Aero Soln inhalation aerosol, Inhale 2 Puffs by mouth every 6 hours as needed for Shortness of Breath., Disp: , Rfl: •  simvastatin (ZOCOR) 10 MG Tab, Take 1 Tab by mouth every bedtime. TAKE 1 TAB BY MOUTH EVERY EVENING., Disp: 90 Tab, Rfl: 3•   loratadine (CLARITIN) 10 MG Tab, TAKE 1 TAB BY MOUTH EVERY DAY. **INS NOT COVERED, Disp: 90 Tab, Rfl: 0•  fluticasone (FLONASE) 50 MCG/ACT nasal spray, INSTILL ONE SPRAY IN NOSE 2 TIMES A DAY. (Patient not taking: Reported on 5/12/2020), Disp: 1 Bottle, Rfl: 2•  fexofenadine (ALLEGRA) 180 MG tablet, Take 1 Tab by mouth every day. (Patient not taking: Reported on 5/12/2020), Disp: 90 Tab, Rfl: 1    Patient was instructed on the use of medications, either prescriptions or OTC and informed on when the appropriate follow up time period should be. In addition, patient was also instructed that should any acute worsening occur that they should notify this clinic asap or call 911.          Review of Systems   Constitutional: Negative.  Negative for chills and fever.   HENT: Negative.  Negative for hearing loss.    Eyes: Negative.  Negative for blurred vision and double vision.   Respiratory: Negative.  Negative for cough and hemoptysis.    Cardiovascular: Positive for chest pain. Negative for palpitations.   Gastrointestinal: Negative.  Negative for heartburn and nausea.   Genitourinary: Negative.  Negative for dysuria.   Musculoskeletal: Negative.  Negative for myalgias.   Skin: Negative.  Negative for rash.   Neurological: Negative.  Negative for dizziness, tingling and headaches.   Endo/Heme/Allergies: Negative.  Does not bruise/bleed easily.   Psychiatric/Behavioral: Negative.  Negative for depression and suicidal ideas.   All other systems reviewed and are negative.         Objective:     /82 (BP Location: Left arm, Patient Position: Sitting)   Pulse 92   Temp 37 °C (98.6 °F) (Tympanic)   Resp 16   Ht 1.829 m (6')   Wt 88.9 kg (196 lb)   SpO2 97%   BMI 26.58 kg/m²      Physical Exam  Vitals signs and nursing note reviewed.   Constitutional:       General: He is not in acute distress.     Appearance: He is well-developed. He is not diaphoretic.   HENT:      Head: Normocephalic and atraumatic.       Mouth/Throat:      Pharynx: No oropharyngeal exudate.   Eyes:      Pupils: Pupils are equal, round, and reactive to light.   Cardiovascular:      Rate and Rhythm: Normal rate and regular rhythm.      Heart sounds: Normal heart sounds. No murmur. No friction rub. No gallop.    Pulmonary:      Effort: Pulmonary effort is normal. No respiratory distress.      Breath sounds: Normal breath sounds. No wheezing or rales.   Chest:      Chest wall: No tenderness.   Neurological:      Mental Status: He is alert and oriented to person, place, and time.   Psychiatric:         Behavior: Behavior normal.         Thought Content: Thought content normal.         Judgment: Judgment normal.                 Assessment/Plan:       1. Chest pain, unspecified type    - NM-CARDIAC STRESS TEST; Future  - Comp Metabolic Panel; Future  - CBC WITHOUT DIFFERENTIAL; Future    2. Atypical chest pain    - NM-CARDIAC STRESS TEST; Future  - Comp Metabolic Panel; Future  - CBC WITHOUT DIFFERENTIAL; Future    3. Mixed hyperlipidemia    - NM-CARDIAC STRESS TEST; Future  - Comp Metabolic Panel; Future  - CBC WITHOUT DIFFERENTIAL; Future

## 2020-05-13 ENCOUNTER — HOSPITAL ENCOUNTER (OUTPATIENT)
Dept: LAB | Facility: MEDICAL CENTER | Age: 50
End: 2020-05-13
Attending: FAMILY MEDICINE
Payer: COMMERCIAL

## 2020-05-13 DIAGNOSIS — R07.89 ATYPICAL CHEST PAIN: ICD-10-CM

## 2020-05-13 DIAGNOSIS — E78.2 MIXED HYPERLIPIDEMIA: ICD-10-CM

## 2020-05-13 DIAGNOSIS — R07.9 CHEST PAIN, UNSPECIFIED TYPE: ICD-10-CM

## 2020-05-13 LAB
ALBUMIN SERPL BCP-MCNC: 4.7 G/DL (ref 3.2–4.9)
ALBUMIN/GLOB SERPL: 2 G/DL
ALP SERPL-CCNC: 77 U/L (ref 30–99)
ALT SERPL-CCNC: 25 U/L (ref 2–50)
ANION GAP SERPL CALC-SCNC: 12 MMOL/L (ref 7–16)
AST SERPL-CCNC: 20 U/L (ref 12–45)
BILIRUB SERPL-MCNC: 0.4 MG/DL (ref 0.1–1.5)
BUN SERPL-MCNC: 8 MG/DL (ref 8–22)
CALCIUM SERPL-MCNC: 9.7 MG/DL (ref 8.5–10.5)
CHLORIDE SERPL-SCNC: 102 MMOL/L (ref 96–112)
CO2 SERPL-SCNC: 25 MMOL/L (ref 20–33)
CREAT SERPL-MCNC: 0.89 MG/DL (ref 0.5–1.4)
ERYTHROCYTE [DISTWIDTH] IN BLOOD BY AUTOMATED COUNT: 46.2 FL (ref 35.9–50)
GLOBULIN SER CALC-MCNC: 2.3 G/DL (ref 1.9–3.5)
GLUCOSE SERPL-MCNC: 79 MG/DL (ref 65–99)
HCT VFR BLD AUTO: 49.2 % (ref 42–52)
HGB BLD-MCNC: 16.3 G/DL (ref 14–18)
MCH RBC QN AUTO: 30.8 PG (ref 27–33)
MCHC RBC AUTO-ENTMCNC: 33.1 G/DL (ref 33.7–35.3)
MCV RBC AUTO: 93 FL (ref 81.4–97.8)
PLATELET # BLD AUTO: 281 K/UL (ref 164–446)
PMV BLD AUTO: 10.6 FL (ref 9–12.9)
POTASSIUM SERPL-SCNC: 4.3 MMOL/L (ref 3.6–5.5)
PROT SERPL-MCNC: 7 G/DL (ref 6–8.2)
RBC # BLD AUTO: 5.29 M/UL (ref 4.7–6.1)
SODIUM SERPL-SCNC: 139 MMOL/L (ref 135–145)
WBC # BLD AUTO: 7.9 K/UL (ref 4.8–10.8)

## 2020-05-13 PROCEDURE — 80053 COMPREHEN METABOLIC PANEL: CPT

## 2020-05-13 PROCEDURE — 85027 COMPLETE CBC AUTOMATED: CPT

## 2020-05-13 PROCEDURE — 36415 COLL VENOUS BLD VENIPUNCTURE: CPT

## 2020-05-19 ENCOUNTER — TELEPHONE (OUTPATIENT)
Dept: MEDICAL GROUP | Facility: PHYSICIAN GROUP | Age: 50
End: 2020-05-19

## 2020-05-19 ENCOUNTER — HOSPITAL ENCOUNTER (OUTPATIENT)
Dept: RADIOLOGY | Facility: MEDICAL CENTER | Age: 50
End: 2020-05-19
Attending: FAMILY MEDICINE
Payer: COMMERCIAL

## 2020-05-19 DIAGNOSIS — R07.9 CHEST PAIN, UNSPECIFIED TYPE: ICD-10-CM

## 2020-05-19 DIAGNOSIS — R07.89 ATYPICAL CHEST PAIN: ICD-10-CM

## 2020-05-19 DIAGNOSIS — E78.2 MIXED HYPERLIPIDEMIA: ICD-10-CM

## 2020-05-19 NOTE — TELEPHONE ENCOUNTER
Pt called and said you had mentioned a tronopin test when you ordered his labs but that was not ordered and he wanted it. He also said he cancelled his stress test because he didn't realize it was a 3 hour long test. He is wanting to know if the test in neccessary or if there is another option. Please advise.

## 2020-05-26 ENCOUNTER — HOSPITAL ENCOUNTER (OUTPATIENT)
Dept: RADIOLOGY | Facility: MEDICAL CENTER | Age: 50
End: 2020-05-26
Attending: FAMILY MEDICINE
Payer: COMMERCIAL

## 2020-05-26 PROCEDURE — A9502 TC99M TETROFOSMIN: HCPCS

## 2020-05-26 PROCEDURE — 78452 HT MUSCLE IMAGE SPECT MULT: CPT | Mod: 26 | Performed by: INTERNAL MEDICINE

## 2020-05-26 PROCEDURE — 93018 CV STRESS TEST I&R ONLY: CPT | Performed by: INTERNAL MEDICINE

## 2020-05-26 NOTE — PROGRESS NOTES
Patient presents to NM suite for cardiac stress test with MPI. Nursing goals identified: knowledge deficit, potential for anxiety r/t stress test, potential for compromised cardiac output. Care plan includes educating patient, reassurance and access to ACLS cart/team. Labs and ECG reviewed. No caffeine and NPO confirmed. Resting images attained and patient prepped for treadmill stress study. PT EXCEEDED 85% TARGET HR. Patient reported these symptoms: INCREASED WOB W/EXERCISE. DENIED CARDIAC SYMPTOMS. RARE PVC AT PEAK EXERCISE. NONE AT REST OR IN RECOVERY. Water and/or caffeinated beverage provided. Symptoms resolved.

## 2020-06-01 ENCOUNTER — OFFICE VISIT (OUTPATIENT)
Dept: MEDICAL GROUP | Facility: PHYSICIAN GROUP | Age: 50
End: 2020-06-01
Payer: COMMERCIAL

## 2020-06-01 ENCOUNTER — TELEPHONE (OUTPATIENT)
Dept: MEDICAL GROUP | Facility: PHYSICIAN GROUP | Age: 50
End: 2020-06-01

## 2020-06-01 ENCOUNTER — APPOINTMENT (OUTPATIENT)
Dept: CARDIOLOGY | Facility: MEDICAL CENTER | Age: 50
End: 2020-06-01
Payer: COMMERCIAL

## 2020-06-01 VITALS
OXYGEN SATURATION: 94 % | DIASTOLIC BLOOD PRESSURE: 82 MMHG | HEART RATE: 83 BPM | TEMPERATURE: 98.6 F | BODY MASS INDEX: 26.98 KG/M2 | SYSTOLIC BLOOD PRESSURE: 120 MMHG | HEIGHT: 74 IN | RESPIRATION RATE: 16 BRPM | WEIGHT: 210.2 LBS

## 2020-06-01 DIAGNOSIS — Z72.0 TOBACCO ABUSE: ICD-10-CM

## 2020-06-01 DIAGNOSIS — R07.89 CHEST TIGHTNESS: ICD-10-CM

## 2020-06-01 PROCEDURE — 99214 OFFICE O/P EST MOD 30 MIN: CPT | Performed by: FAMILY MEDICINE

## 2020-06-01 RX ORDER — BUSPIRONE HYDROCHLORIDE 15 MG/1
15 TABLET ORAL 2 TIMES DAILY
Qty: 60 TAB | Refills: 3 | Status: SHIPPED | OUTPATIENT
Start: 2020-06-01 | End: 2020-11-09

## 2020-06-01 ASSESSMENT — ENCOUNTER SYMPTOMS
HEARTBURN: 0
MUSCULOSKELETAL NEGATIVE: 1
NAUSEA: 0
RESPIRATORY NEGATIVE: 1
MYALGIAS: 0
TINGLING: 0
HEMOPTYSIS: 0
CONSTITUTIONAL NEGATIVE: 1
BLURRED VISION: 0
PALPITATIONS: 0
DIZZINESS: 0
CARDIOVASCULAR NEGATIVE: 1
PSYCHIATRIC NEGATIVE: 1
DEPRESSION: 0
FEVER: 0
CHILLS: 0
EYES NEGATIVE: 1
BRUISES/BLEEDS EASILY: 0
GASTROINTESTINAL NEGATIVE: 1
HEADACHES: 0
NEUROLOGICAL NEGATIVE: 1
COUGH: 0
DOUBLE VISION: 0

## 2020-06-01 ASSESSMENT — FIBROSIS 4 INDEX: FIB4 SCORE: 0.7

## 2020-06-01 NOTE — PROGRESS NOTES
"Subjective:      Sebastian Asif is a 49 y.o. male who presents with Follow-Up (anxiety) and Medication Refill (inhaler, requesting different rx, current not working)            Several month history of \"tightness in chest\"  Feels ok when he wakes but then as he goes to work as a  feels a tightness in the chest that persists throughout the day  Stress thallium totally normal  Will try advair if this is a bronchial issue first before we do any pulmonology/gi workup further. Also wants to try meds to stop smoking    1. Chest tightness    - fluticasone-salmeterol (ADVAIR) 250-50 MCG/DOSE AEROSOL POWDER, BREATH ACTIVATED; Inhale 1 Puff by mouth every 12 hours.  Dispense: 1 Inhaler; Refill: 3    2. Tobacco abuse    - busPIRone (BUSPAR) 15 MG tablet; Take 1 Tab by mouth 2 times a day.  Dispense: 60 Tab; Refill: 3    Past Medical History:  2014: Elevated cholesterol      Comment:  was taking fish oil.  last labs 5 months ago  Past Surgical History:  No date: KNEE ARTHROSCOPY; Left  No date: OTHER      Comment:  TEste procedure for blockage repair  Social History    Tobacco Use      Smoking status: Current Every Day Smoker        Types: Cigars      Smokeless tobacco: Never Used    Alcohol use: No      Alcohol/week: 0.0 oz    Drug use: No    Review of patient's family history indicates:  Problem: Cancer      Relation: Mother          Age of Onset: (Not Specified)          Comment: unknown area  Problem: Heart Disease      Relation: Father          Age of Onset: (Not Specified)      Current Outpatient Medications: •  fluticasone-salmeterol (ADVAIR) 250-50 MCG/DOSE AEROSOL POWDER, BREATH ACTIVATED, Inhale 1 Puff by mouth every 12 hours., Disp: 1 Inhaler, Rfl: 3•  busPIRone (BUSPAR) 15 MG tablet, Take 1 Tab by mouth 2 times a day., Disp: 60 Tab, Rfl: 3•  albuterol 108 (90 Base) MCG/ACT Aero Soln inhalation aerosol, Inhale 2 Puffs by mouth every 6 hours as needed for Shortness of Breath., Disp: , Rfl: •  " "simvastatin (ZOCOR) 10 MG Tab, Take 1 Tab by mouth every bedtime. TAKE 1 TAB BY MOUTH EVERY EVENING., Disp: 90 Tab, Rfl: 3•  loratadine (CLARITIN) 10 MG Tab, TAKE 1 TAB BY MOUTH EVERY DAY. **INS NOT COVERED, Disp: 90 Tab, Rfl: 0•  fluticasone (FLONASE) 50 MCG/ACT nasal spray, INSTILL ONE SPRAY IN NOSE 2 TIMES A DAY. (Patient not taking: Reported on 5/12/2020), Disp: 1 Bottle, Rfl: 2•  fexofenadine (ALLEGRA) 180 MG tablet, Take 1 Tab by mouth every day. (Patient not taking: Reported on 5/12/2020), Disp: 90 Tab, Rfl: 1    Patient was instructed on the use of medications, either prescriptions or OTC and informed on when the appropriate follow up time period should be. In addition, patient was also instructed that should any acute worsening occur that they should notify this clinic asap or call 911.          Review of Systems   Constitutional: Negative.  Negative for chills and fever.   HENT: Negative.  Negative for hearing loss.    Eyes: Negative.  Negative for blurred vision and double vision.   Respiratory: Negative.  Negative for cough and hemoptysis.    Cardiovascular: Negative.  Negative for chest pain and palpitations.   Gastrointestinal: Negative.  Negative for heartburn and nausea.   Genitourinary: Negative.  Negative for dysuria.   Musculoskeletal: Negative.  Negative for myalgias.   Skin: Negative.  Negative for rash.   Neurological: Negative.  Negative for dizziness, tingling and headaches.   Endo/Heme/Allergies: Negative.  Does not bruise/bleed easily.   Psychiatric/Behavioral: Negative.  Negative for depression and suicidal ideas.   All other systems reviewed and are negative.         Objective:     /82 (BP Location: Left arm, Patient Position: Sitting)   Pulse 83   Temp 37 °C (98.6 °F) (Tympanic)   Resp 16   Ht 1.88 m (6' 2\")   Wt 95.3 kg (210 lb 3.2 oz)   SpO2 94%   BMI 26.99 kg/m²      Physical Exam  Vitals signs and nursing note reviewed.   Constitutional:       General: He is not in acute " distress.     Appearance: He is well-developed. He is not diaphoretic.   HENT:      Head: Normocephalic and atraumatic.      Mouth/Throat:      Pharynx: No oropharyngeal exudate.   Eyes:      Pupils: Pupils are equal, round, and reactive to light.   Cardiovascular:      Rate and Rhythm: Normal rate and regular rhythm.      Heart sounds: Normal heart sounds. No murmur. No friction rub. No gallop.    Pulmonary:      Effort: Pulmonary effort is normal. No respiratory distress.      Breath sounds: Normal breath sounds. No wheezing or rales.   Chest:      Chest wall: No tenderness.   Neurological:      Mental Status: He is alert and oriented to person, place, and time.   Psychiatric:         Behavior: Behavior normal.         Thought Content: Thought content normal.         Judgment: Judgment normal.                 Assessment/Plan:       1. Chest tightness    - fluticasone-salmeterol (ADVAIR) 250-50 MCG/DOSE AEROSOL POWDER, BREATH ACTIVATED; Inhale 1 Puff by mouth every 12 hours.  Dispense: 1 Inhaler; Refill: 3    2. Tobacco abuse    - busPIRone (BUSPAR) 15 MG tablet; Take 1 Tab by mouth 2 times a day.  Dispense: 60 Tab; Refill: 3

## 2020-09-01 ENCOUNTER — OFFICE VISIT (OUTPATIENT)
Dept: MEDICAL GROUP | Facility: PHYSICIAN GROUP | Age: 50
End: 2020-09-01
Payer: COMMERCIAL

## 2020-09-01 VITALS
HEART RATE: 74 BPM | HEIGHT: 74 IN | OXYGEN SATURATION: 94 % | DIASTOLIC BLOOD PRESSURE: 72 MMHG | BODY MASS INDEX: 26.95 KG/M2 | SYSTOLIC BLOOD PRESSURE: 120 MMHG | TEMPERATURE: 98.2 F | RESPIRATION RATE: 16 BRPM | WEIGHT: 210 LBS

## 2020-09-01 DIAGNOSIS — R53.82 CHRONIC FATIGUE AND MALAISE: ICD-10-CM

## 2020-09-01 DIAGNOSIS — B37.0 THRUSH: ICD-10-CM

## 2020-09-01 DIAGNOSIS — R53.81 CHRONIC FATIGUE AND MALAISE: ICD-10-CM

## 2020-09-01 DIAGNOSIS — R07.89 CHEST TIGHTNESS: ICD-10-CM

## 2020-09-01 PROCEDURE — 99214 OFFICE O/P EST MOD 30 MIN: CPT | Performed by: FAMILY MEDICINE

## 2020-09-01 RX ORDER — FLUCONAZOLE 100 MG/1
100 TABLET ORAL DAILY
Qty: 5 TAB | Refills: 0 | Status: SHIPPED | OUTPATIENT
Start: 2020-09-01 | End: 2022-01-17

## 2020-09-01 ASSESSMENT — ENCOUNTER SYMPTOMS
MUSCULOSKELETAL NEGATIVE: 1
NAUSEA: 0
NEUROLOGICAL NEGATIVE: 1
CARDIOVASCULAR NEGATIVE: 1
GASTROINTESTINAL NEGATIVE: 1
DIZZINESS: 0
HEADACHES: 0
HEMOPTYSIS: 0
BLURRED VISION: 0
BRUISES/BLEEDS EASILY: 0
DOUBLE VISION: 0
DEPRESSION: 0
TINGLING: 0
HEARTBURN: 0
PALPITATIONS: 0
EYES NEGATIVE: 1
CONSTITUTIONAL NEGATIVE: 1
MYALGIAS: 0
CHILLS: 0
PSYCHIATRIC NEGATIVE: 1
COUGH: 0
FEVER: 0
RESPIRATORY NEGATIVE: 1

## 2020-09-01 ASSESSMENT — FIBROSIS 4 INDEX: FIB4 SCORE: 0.7

## 2020-09-01 NOTE — PROGRESS NOTES
Subjective:      Sebastian Asif is a 49 y.o. male who presents with Follow-Up            1. Thrush  Started once using advair, will treat and advised on use  - fluconazole (DIFLUCAN) 100 MG Tab; Take 1 Tab by mouth every day.  Dispense: 5 Tab; Refill: 0    2. Chronic fatigue and malaise  Tired all the time  - Comp Metabolic Panel; Future  - FREE THYROXINE; Future  - TRIIDOTHYRONINE; Future  - TESTOSTERONE SERUM; Future  - TSH; Future  - VITAMIN D,25 HYDROXY; Future  - CBC WITH DIFFERENTIAL; Future  - VITAMIN B12; Future  - FOLATE; Future  - IRON/TOTAL IRON BIND; Future    3. Chest tightness  Improved with advair    Past Medical History:  2014: Elevated cholesterol      Comment:  was taking fish oil.  last labs 5 months ago  Past Surgical History:  No date: KNEE ARTHROSCOPY; Left  No date: OTHER      Comment:  TEste procedure for blockage repair  Social History    Tobacco Use      Smoking status: Current Every Day Smoker        Types: Cigars      Smokeless tobacco: Never Used    Alcohol use: No      Alcohol/week: 0.0 oz    Drug use: No    Review of patient's family history indicates:  Problem: Cancer      Relation: Mother          Age of Onset: (Not Specified)          Comment: unknown area  Problem: Heart Disease      Relation: Father          Age of Onset: (Not Specified)      Current Outpatient Medications: •  fluconazole (DIFLUCAN) 100 MG Tab, Take 1 Tab by mouth every day., Disp: 5 Tab, Rfl: 0•  fluticasone-salmeterol (ADVAIR) 250-50 MCG/DOSE AEROSOL POWDER, BREATH ACTIVATED, Inhale 1 Puff by mouth every 12 hours., Disp: 1 Inhaler, Rfl: 3•  busPIRone (BUSPAR) 15 MG tablet, Take 1 Tab by mouth 2 times a day., Disp: 60 Tab, Rfl: 3•  albuterol 108 (90 Base) MCG/ACT Aero Soln inhalation aerosol, Inhale 2 Puffs by mouth every 6 hours as needed for Shortness of Breath., Disp: , Rfl: •  simvastatin (ZOCOR) 10 MG Tab, Take 1 Tab by mouth every bedtime. TAKE 1 TAB BY MOUTH EVERY EVENING., Disp: 90 Tab, Rfl: 3•   "loratadine (CLARITIN) 10 MG Tab, TAKE 1 TAB BY MOUTH EVERY DAY. **INS NOT COVERED, Disp: 90 Tab, Rfl: 0•  fluticasone (FLONASE) 50 MCG/ACT nasal spray, INSTILL ONE SPRAY IN NOSE 2 TIMES A DAY. (Patient not taking: Reported on 5/12/2020), Disp: 1 Bottle, Rfl: 2•  fexofenadine (ALLEGRA) 180 MG tablet, Take 1 Tab by mouth every day. (Patient not taking: Reported on 5/12/2020), Disp: 90 Tab, Rfl: 1    Patient was instructed on the use of medications, either prescriptions or OTC and informed on when the appropriate follow up time period should be. In addition, patient was also instructed that should any acute worsening occur that they should notify this clinic asap or call 911.          Review of Systems   Constitutional: Negative.  Negative for chills and fever.   HENT: Negative.  Negative for hearing loss.    Eyes: Negative.  Negative for blurred vision and double vision.   Respiratory: Negative.  Negative for cough and hemoptysis.    Cardiovascular: Negative.  Negative for chest pain and palpitations.   Gastrointestinal: Negative.  Negative for heartburn and nausea.   Genitourinary: Negative.  Negative for dysuria.   Musculoskeletal: Negative.  Negative for myalgias.   Skin: Positive for rash.   Neurological: Negative.  Negative for dizziness, tingling and headaches.   Endo/Heme/Allergies: Negative.  Does not bruise/bleed easily.   Psychiatric/Behavioral: Negative.  Negative for depression and suicidal ideas.   All other systems reviewed and are negative.         Objective:     /72 (BP Location: Left arm, Patient Position: Sitting, BP Cuff Size: Adult)   Pulse 74   Temp 36.8 °C (98.2 °F) (Temporal)   Resp 16   Ht 1.88 m (6' 2\")   Wt 95.3 kg (210 lb)   SpO2 94%   BMI 26.96 kg/m²      Physical Exam  Vitals signs and nursing note reviewed.   Constitutional:       General: He is not in acute distress.     Appearance: He is well-developed. He is not diaphoretic.   HENT:      Head: Normocephalic and atraumatic. "      Mouth/Throat:      Pharynx: No oropharyngeal exudate.        Comments: White rash on palate  Eyes:      Pupils: Pupils are equal, round, and reactive to light.   Cardiovascular:      Rate and Rhythm: Normal rate and regular rhythm.      Heart sounds: Normal heart sounds. No murmur. No friction rub. No gallop.    Pulmonary:      Effort: Pulmonary effort is normal. No respiratory distress.      Breath sounds: Normal breath sounds. No wheezing or rales.   Chest:      Chest wall: No tenderness.   Neurological:      Mental Status: He is alert and oriented to person, place, and time.   Psychiatric:         Behavior: Behavior normal.         Thought Content: Thought content normal.         Judgment: Judgment normal.                 Assessment/Plan:        1. Thrush    - fluconazole (DIFLUCAN) 100 MG Tab; Take 1 Tab by mouth every day.  Dispense: 5 Tab; Refill: 0    2. Chronic fatigue and malaise    - Comp Metabolic Panel; Future  - FREE THYROXINE; Future  - TRIIDOTHYRONINE; Future  - TESTOSTERONE SERUM; Future  - TSH; Future  - VITAMIN D,25 HYDROXY; Future  - CBC WITH DIFFERENTIAL; Future  - VITAMIN B12; Future  - FOLATE; Future  - IRON/TOTAL IRON BIND; Future    3. Chest tightness

## 2020-09-27 DIAGNOSIS — R07.89 CHEST TIGHTNESS: ICD-10-CM

## 2020-10-05 ENCOUNTER — APPOINTMENT (OUTPATIENT)
Dept: URGENT CARE | Facility: CLINIC | Age: 50
End: 2020-10-05
Payer: COMMERCIAL

## 2020-10-05 ENCOUNTER — APPOINTMENT (OUTPATIENT)
Dept: RADIOLOGY | Facility: IMAGING CENTER | Age: 50
End: 2020-10-05
Attending: NURSE PRACTITIONER
Payer: COMMERCIAL

## 2020-10-05 ENCOUNTER — OCCUPATIONAL MEDICINE (OUTPATIENT)
Dept: URGENT CARE | Facility: CLINIC | Age: 50
End: 2020-10-05
Payer: COMMERCIAL

## 2020-10-05 VITALS
HEART RATE: 78 BPM | TEMPERATURE: 97.4 F | RESPIRATION RATE: 14 BRPM | HEIGHT: 74 IN | WEIGHT: 209 LBS | BODY MASS INDEX: 26.82 KG/M2 | DIASTOLIC BLOOD PRESSURE: 90 MMHG | SYSTOLIC BLOOD PRESSURE: 124 MMHG | OXYGEN SATURATION: 95 %

## 2020-10-05 DIAGNOSIS — S67.22XA CRUSHING INJURY OF LEFT HAND, INITIAL ENCOUNTER: ICD-10-CM

## 2020-10-05 DIAGNOSIS — R20.2 LEFT HAND PARESTHESIA: ICD-10-CM

## 2020-10-05 DIAGNOSIS — M79.89 SWELLING OF LEFT HAND: ICD-10-CM

## 2020-10-05 PROCEDURE — 73130 X-RAY EXAM OF HAND: CPT | Mod: TC,LT,29 | Performed by: NURSE PRACTITIONER

## 2020-10-05 PROCEDURE — 99214 OFFICE O/P EST MOD 30 MIN: CPT | Performed by: NURSE PRACTITIONER

## 2020-10-05 ASSESSMENT — ENCOUNTER SYMPTOMS
FOCAL WEAKNESS: 0
TINGLING: 1
CONSTITUTIONAL NEGATIVE: 1

## 2020-10-05 ASSESSMENT — VISUAL ACUITY: OU: 1

## 2020-10-05 ASSESSMENT — FIBROSIS 4 INDEX: FIB4 SCORE: 0.7

## 2020-10-05 NOTE — LETTER
[facility]  [address] [place] [address]  Phone:  [phone] - Fax:  [phone]   [facility] Progress Report and Disability Certification  Date of Service: 10/5/2020   No Show:  No  Date / Time of Next Visit: 10/19/2020   Claim Information   Patient Name: Sebastian Asif  Claim Number:     Employer: [employer]  Date of Injury: 9/17/2020     Insurer / TPA: [employer]  ID / SSN:     Occupation: [place]  Diagnosis: Diagnoses of Swelling of left hand, Left hand paresthesia, and Crushing injury of left hand, initial encounter were pertinent to this visit.    Medical Information   Related to Industrial Injury? Yes    Subjective Complaints:  DOI: 9/17/2020 @ 11:00 AM. Initial visit. 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  Right hand initially hurt, but is feeling better.  Left hand had some pain which improved.  Has a small cut which is healing well.  Continues to have a local area of swelling at the top of his left hand which causes a tingling sensation when pressed upon.  Denies second job.  Denies previous injury to the left hand.   Objective Findings: Cardiovascular:      Rate and Rhythm: Normal rate.      Pulses: Normal pulses.   Musculoskeletal: Normal range of motion.         General: No deformity.      Left hand: He exhibits laceration (Small, healing well, over left 2nd metacarpal) and swelling (Over left 2nd metacarpal). He exhibits normal range of motion, no tenderness, normal capillary refill and no deformity. Normal strength noted.      Comments: Paresthesia will palpation over left 2nd metacarpal   Skin:     General: Skin is warm and dry.      Capillary Refill: Capillary refill takes less than 2 seconds.      Coloration: Skin is not pale.   Neurological:      General: No focal deficit present.    Mental Status: He is alert and oriented to person, place, and time.      Sensory: No sensory deficit.      Motor: No weakness.      Coordination: Coordination normal.   Pre-Existing Condition(s):     Assessment:   Initial Visit    Status: Additional Care Required  Permanent Disability:No    Plan: Transfer Care    Diagnostics: X-ray    Comments:  Initial visit. X-ray negative. RICE and ibuprofen as needed. Full duty. Follow up with occupational medicine. Return to urgent care if symptoms change/worsen.    Disability Information   Status: Released to Full Duty    From:  10/5/2020  Through: 10/19/2020 Restrictions are: Temporary   Physical Restrictions   Sitting:    Standing:    Stooping:    Bending:      Squatting:    Walking:    Climbing:    Pushing:      Pulling:    Other:    Reaching Above Shoulder (L):   Reaching Above Shoulder (R):       Reaching Below Shoulder (L):    Reaching Below Shoulder (R):      Not to exceed Weight Limits   Carrying(hrs):   Weight Limit(lb):   Lifting(hrs):   Weight  Limit(lb):     Comments:      Repetitive Actions   Hands: i.e. Fine Manipulations from Grasping:     Feet: i.e. Operating Foot Controls:     Driving / Operate Machinery:     Provider Name:   MANUEL Jackson Physician Signature:  Physician Name:     Clinic Name / Location: [address] [address] Clinic Phone Number: Dept: [phone]   Appointment Time: 5:15 Pm Visit Start Time: 5:25 PM   Check-In Time:  5:20 Pm Visit Discharge Time:  6:30 PM   Original-Treating Physician or Chiropractor    Page 2-Insurer/TPA    Page 3-Employer    Page 4-Employee

## 2020-10-05 NOTE — LETTER
"EMPLOYEE’S CLAIM FOR COMPENSATION/ REPORT OF INITIAL TREATMENT  FORM C-4    EMPLOYEE’S CLAIM - PROVIDE ALL INFORMATION REQUESTED   First Name  Sebastian Last Name  Laya Birthdate                    1970                Sex  male Claim Number   Home Address  [address] Age  49 y.o. Height  1.88 m (6' 2\") Weight  94.8 kg (209 lb) [facility] Zip  [address] Telephone  [phone] (home)    Mailing Address  [address] Zip  [address] Primary Language Spoken  English    Insurer   Third Party   [employer]   Employee's Occupation (Job Title) When Injury or Occupational Disease Occurred  [place]    Employer's Name  [employer]  Telephone  [phone]    Employer Address  [address]  [address]   Date of Injury  9/17/2020               Hour of Injury  11:00 AM Date Employer Notified  9/28/2020 Last Day of Work after Injury     or Occupational Disease  10/3/2020 Supervisor to Whom Injury     Reported  [facility]   Address or Location of Accident (if applicable)  [[address] [facility]]   What were you doing at the time of accident? (if applicable)  Vehicle [place] Report    How did this injury or occupational disease occur? (Be specific an answer in detail. Use additional sheet if necessary)  Scissors Type lift close on Right And left Hand   If you believe that you have an occupational disease, when did you first have knowledge of the disability and it relationship to your employment?  n/a Witnesses to the Accident  Body Camera Video      Nature of Injury or Occupational Disease  Crushing  Part(s) of Body Injured or Affected  Hand (L), ,     I certify that the above is true and correct to the best of my knowledge and that I have provided this information in order to obtain the benefits of [place]’s [place] " [place]  I hereby authorize any physician, chiropractor, surgeon, practitioner, or other person, any hospital, including [facility] or [facility], any medical service organization, any insurance company, or other institution or organization to release to each other, any medical or other information, including benefits paid or payable, pertinent to this injury or disease, except information relative to diagnosis, treatment and/or counseling for AIDS, psychological conditions, alcohol or controlled substances, for which I must give specific authorization.  A Photostat of this authorization shall be as valid as the original.     Date   Place   Employee’s Signature   THIS REPORT MUST BE COMPLETED AND MAILED WITHIN 3 WORKING DAYS OF TREATMENT   Place  [facility]  Name of Facility  [facility]    Date  10/5/2020 Diagnosis  (M79.89) Swelling of left hand  (R20.2) Left hand paresthesia  (S67.22XA) Crushing injury of left hand, initial encounter Is there evidence the injured employee was under the              influence of alcohol and/or another controlled substance at the time of accident?   Hour  5:25 PM Description of Injury or Disease  Diagnoses of Swelling of left hand, Left hand paresthesia, and Crushing injury of left hand, initial encounter were pertinent to this visit. No   Treatment  Initial visit. X-ray negative. RICE and ibuprofen as needed. Full duty. Follow up with occupational medicine. Return to urgent care if symptoms change/worsen.  Have you advised the patient to remain off work five days or     more? No   X-Ray Findings  Negative   If Yes   From Date  To Date      From information given by the employee, together with medical evidence, can you directly connect this injury or occupational disease as job incurred?  Yes If No Full Duty    Yes Modified Duty      Is additional  "medical care by a physician indicated?  Yes If Modified Duty, Specify any Limitations / Restrictions      Do you know of any previous injury or disease contributing to this condition or occupational disease?                            No   Date  10/5/2020 Print Doctor’s Name   MANUEL Jackson I certify the employer’s copy of  this form was mailed on:   Address  [address]     [place]  [address]    Provider’s Tax ID Number  [id number] Telephone  Dept: [phone]      e-GIOVANNI Petersen  Signature:     Degree          ORIGINAL-TREATING PHYSICIAN OR CHIROPRACTOR    PAGE 2-INSURER/TPA    PAGE 3-EMPLOYER    PAGE 4-EMPLOYEE        Form C-4 (rev.10/07)          BRIEF DESCRIPTION OF RIGHTS AND BENEFITS  (Pursuant to [place] 616C.050)    Notice of Injury or Occupational Disease (Incident Report Form C-1): If an injury or occupational disease (OD) arises out of and in the course of employment, you must provide written notice to your employer as soon as practicable, but no later than 7 days after the accident or OD. Your employer shall maintain a sufficient supply of the required forms.     Claim for Compensation (Form C-4): If medical treatment is sought, the form C-4 is available at the place of initial treatment. A completed \"Claim for Compensation\" (Form C-4) must be filed within 90 days after an accident or OD. The treating physician or chiropractor must, within 3 working days after treatment, complete and mail to the employer, the employer's insurer and third-party , the Claim for Compensation.     Medical Treatment: If you require medical treatment for your on-the-job injury or OD, you may be required to select a physician or chiropractor from a list provided by your workers’ compensation insurer, if it has contracted with an Organization for Managed Care (MCO) or Preferred Provider Organization (PPO) or providers of " health care. If your employer has not entered into a contract with an MCO or PPO, you may select a physician or chiropractor from the Panel of Physicians and Chiropractors. Any medical costs related to your industrial injury or OD will be paid by your insurer.     Temporary Total Disability (TTD): If your doctor has certified that you are unable to work for a period of at least 5 consecutive days, or 5 cumulative days in a 20-day period, or places restrictions on you that your employer does not accommodate, you may be entitled to TTD compensation.     Temporary Partial Disability (TPD): If the wage you receive upon reemployment is less than the compensation for TTD to which you are entitled, the insurer may be required to pay you TPD compensation to make up the difference. TPD can only be paid for a maximum of 24 months.     Permanent Partial Disability (PPD): When your medical condition is stable and there is an indication of a PPD as a result of your injury or OD, within 30 days, your insurer must arrange for an evaluation by a rating physician or chiropractor to determine the degree of your PPD. The amount of your PPD award depends on the date of injury, the results of the PPD evaluation and your age and wage.     Permanent Total Disability (PTD): If you are medically certified by a treating physician or chiropractor as permanently and totally disabled and have been granted a PTD status by your insurer, you are entitled to receive monthly benefits not to exceed 66 2/3% of your average monthly wage. The amount of your PTD payments is subject to reduction if you previously received a PPD award.     Vocational Rehabilitation Services: You may be eligible for vocational rehabilitation services if you are unable to return to the job due to a permanent physical impairment or permanent restrictions as a result of your injury or occupational disease.     Transportation and Per Patsy Reimbursement: You may be eligible for  travel expenses and per otto associated with medical treatment.     Reopening: You may be able to reopen your claim if your condition worsens after claim closure.     Appeal Process: If you disagree with a written determination issued by the insurer or the insurer does not respond to your request, you may appeal to the Department of Administration, , by following the instructions contained in your determination letter. You must appeal the determination within 70 days from the date of the determination letter at [address], [address], [place], [place] [address], or [address], [address], [place], [place] [address]. If you disagree with the  decision, you may appeal to the Department of Administration, . You must file your appeal within 30 days from the date of the  decision letter at [address], [address], [place], [place] [address], or [address], [place], [place] [address]. If you disagree with a decision of an , you may file a petition for judicial review with the District Court. You must do so within 30 days of the Appeal Officer’s decision. You may be represented by an  at your own expense or you may contact the [facility] for possible representation.     [place]  for Injured Workers (NAIW): If you disagree with a  decision, you may request that NAIW represent you without charge at an  Hearing. For information regarding denial of benefits, you may contact the [facility] at: [address], [address], [place] [address], [phone], or [address], [address], [place] [address], [phone]     To File a Complaint with the Division: If you wish to file a complaint with the  of the Division of Industrial Relations (DIR), please contact the Workers’ Compensation Section, [address], [address], [place], [place]  [address], telephone [phone], or [address], [address], [place], [place] [address], telephone [phone].     For assistance with Workers’ Compensation Issues: You may contact the Office of the Governor Consumer Health Assistance, [address] [place] [address], [address], [place], [place] [address], Toll Free 1-260.579.6808, Web site: [email or web address]., E-mail [email or web address].              __________________________________________________________________                              ___________________         Employee Name / Signature                                                                                                                     Date                                                                                                                                                                                       D-2 (rev. 01/20)

## 2020-10-06 NOTE — PROGRESS NOTES
"Subjective:     Sebastian Asif is a 49 y.o. male who presents for Hand Pain (left)    DOI: 9/17/2020 @ 11:00 AM. Initial visit. 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  Right hand initially hurt, but is feeling better.  Left hand had some pain which improved.  Has a small cut which is healing well.  Continues to have a local area of swelling at the top of his left hand which causes a tingling sensation when pressed upon.  Denies second job.  Denies previous injury to the left hand.    Tdap received 2/27/2017.    Patient was screened prior to rooming and denied COVID-19 diagnosis or contact with a person who has been diagnosed or is suspected to have COVID-19. During this visit, appropriate PPE was worn, hand hygiene was performed, and the patient and any visitors were masked.    PMH: No pertinent past medical history to this problem  MEDS: Medications were reviewed in Epic  ALLERGIES: Allergies were reviewed in Epic  SOCHX: Works as a [employer]  FH: No pertinent family history to this problem    Review of Systems   Constitutional: Negative.    Musculoskeletal:        Left hand pain swelling, tingling   Neurological: Positive for tingling. Negative for focal weakness.   All other systems reviewed and are negative.    Additional details per HPI.      Objective:     /90 (BP Location: Left arm, Patient Position: Sitting)   Pulse 78   Temp 36.3 °C (97.4 °F)   Resp 14   Ht 1.88 m (6' 2\")   Wt 94.8 kg (209 lb)   SpO2 95%   BMI 26.83 kg/m²     Physical Exam  Vitals signs reviewed.   Constitutional:       General: He is not in acute distress.     Appearance: He is well-developed. He is not ill-appearing or toxic-appearing.   HENT:      Head: Normocephalic.      Right Ear: External ear normal.      Left Ear: External ear normal.      Nose: Nose normal.   Eyes:      General: Vision grossly intact.      " Extraocular Movements: Extraocular movements intact.      Conjunctiva/sclera: Conjunctivae normal.   Neck:      Musculoskeletal: Normal range of motion.   Cardiovascular:      Rate and Rhythm: Normal rate.      Pulses: Normal pulses.   Pulmonary:      Effort: Pulmonary effort is normal. No respiratory distress.   Musculoskeletal: Normal range of motion.         General: No deformity.      Left hand: He exhibits laceration (Small, healing well, over left 2nd metacarpal) and swelling (Over left 2nd metacarpal). He exhibits normal range of motion, no tenderness, normal capillary refill and no deformity. Normal strength noted.      Comments: Paresthesia will palpation over left 2nd metacarpal   Skin:     General: Skin is warm and dry.      Capillary Refill: Capillary refill takes less than 2 seconds.      Coloration: Skin is not pale.   Neurological:      General: No focal deficit present.      Mental Status: He is alert and oriented to person, place, and time.      Sensory: No sensory deficit.      Motor: No weakness.      Coordination: Coordination normal.   Psychiatric:         Behavior: Behavior normal. Behavior is cooperative.     X-ray of left hand:    Details    Reading Physician Reading Date Result Priority   Theodora Callejas M.D.  [phone] 10/5/2020 Urgent Care      Narrative & Impression        10/5/2020 5:45 PM     HISTORY/REASON FOR EXAM:  Left hand swelling and pain between the 1st and 2nd metacarpals.        TECHNIQUE/EXAM DESCRIPTION AND NUMBER OF VIEWS:  3 views of the LEFT hand.     COMPARISON: None     FINDINGS:     There is no focal soft tissue swelling.     There is no evidence for displaced fracture or dislocation.     The alignment is maintained.     There is evidence of old trauma involving the ulnar styloid.     IMPRESSION:     1.  Unremarkable left hand series.             Last Resulted: 10/05/20  6:16 PM          Assessment/Plan:     1. Crushing injury of left hand, initial encounter  -  REFERRAL TO OCCUPATIONAL MEDICINE    2. Left hand paresthesia  - DX-HAND 3+ LEFT; Future  - REFERRAL TO OCCUPATIONAL MEDICINE    3. Swelling of left hand  - DX-HAND 3+ LEFT; Future  - REFERRAL TO OCCUPATIONAL MEDICINE    X-ray of left hand ordered. Radiology report and images reviewed by myself.     Initial visit. X-ray negative. RICE and ibuprofen as needed. Full duty. Follow up with occupational medicine. Return to urgent care if symptoms change/worsen.    Differential diagnosis, natural history, supportive care, over-the-counter symptom management per 's instructions, close monitoring, and indications for immediate follow-up discussed.     All questions answered. Patient agrees with the plan of care.

## 2020-11-05 DIAGNOSIS — Z72.0 TOBACCO ABUSE: ICD-10-CM

## 2020-11-09 RX ORDER — BUSPIRONE HYDROCHLORIDE 15 MG/1
TABLET ORAL
Qty: 180 TAB | Refills: 1 | Status: SHIPPED | OUTPATIENT
Start: 2020-11-09 | End: 2021-05-25

## 2020-11-29 DIAGNOSIS — E78.5 HYPERLIPIDEMIA, UNSPECIFIED HYPERLIPIDEMIA TYPE: ICD-10-CM

## 2020-12-03 RX ORDER — SIMVASTATIN 10 MG
10 TABLET ORAL EVERY EVENING
Qty: 90 TAB | Refills: 11 | Status: SHIPPED | OUTPATIENT
Start: 2020-12-03 | End: 2021-12-10 | Stop reason: SDUPTHER

## 2021-05-25 DIAGNOSIS — Z72.0 TOBACCO ABUSE: ICD-10-CM

## 2021-05-25 DIAGNOSIS — R07.89 CHEST TIGHTNESS: ICD-10-CM

## 2021-05-25 RX ORDER — BUSPIRONE HYDROCHLORIDE 15 MG/1
TABLET ORAL
Qty: 180 TABLET | Refills: 1 | Status: SHIPPED | OUTPATIENT
Start: 2021-05-25 | End: 2022-01-17 | Stop reason: SDUPTHER

## 2021-12-10 DIAGNOSIS — E78.5 HYPERLIPIDEMIA, UNSPECIFIED HYPERLIPIDEMIA TYPE: ICD-10-CM

## 2021-12-11 NOTE — TELEPHONE ENCOUNTER
Received request via: Patient    Was the patient seen in the last year in this department? Yes    Does the patient have an active prescription (recently filled or refills available) for medication(s) requested? No     Patient has appointment for 1.3.2022. Will be out of his medications next week. Asking for 30 day supply please.

## 2021-12-13 ENCOUNTER — PATIENT MESSAGE (OUTPATIENT)
Dept: HEALTH INFORMATION MANAGEMENT | Facility: OTHER | Age: 51
End: 2021-12-13

## 2021-12-13 RX ORDER — SIMVASTATIN 10 MG
10 TABLET ORAL EVERY EVENING
Qty: 90 TABLET | Refills: 3 | Status: SHIPPED | OUTPATIENT
Start: 2021-12-13 | End: 2022-01-17 | Stop reason: SDUPTHER

## 2022-01-03 ENCOUNTER — APPOINTMENT (OUTPATIENT)
Dept: MEDICAL GROUP | Facility: PHYSICIAN GROUP | Age: 52
End: 2022-01-03
Payer: COMMERCIAL

## 2022-01-17 ENCOUNTER — OFFICE VISIT (OUTPATIENT)
Dept: MEDICAL GROUP | Facility: PHYSICIAN GROUP | Age: 52
End: 2022-01-17
Payer: COMMERCIAL

## 2022-01-17 VITALS
HEIGHT: 74 IN | TEMPERATURE: 98.2 F | RESPIRATION RATE: 12 BRPM | BODY MASS INDEX: 26.95 KG/M2 | SYSTOLIC BLOOD PRESSURE: 114 MMHG | DIASTOLIC BLOOD PRESSURE: 82 MMHG | HEART RATE: 81 BPM | WEIGHT: 210 LBS | OXYGEN SATURATION: 96 %

## 2022-01-17 DIAGNOSIS — R07.89 CHEST TIGHTNESS: ICD-10-CM

## 2022-01-17 DIAGNOSIS — Z12.12 SCREENING FOR COLORECTAL CANCER: ICD-10-CM

## 2022-01-17 DIAGNOSIS — Z12.11 SCREENING FOR COLORECTAL CANCER: ICD-10-CM

## 2022-01-17 DIAGNOSIS — Z72.0 TOBACCO ABUSE: ICD-10-CM

## 2022-01-17 DIAGNOSIS — E78.5 HYPERLIPIDEMIA, UNSPECIFIED HYPERLIPIDEMIA TYPE: ICD-10-CM

## 2022-01-17 PROCEDURE — 99214 OFFICE O/P EST MOD 30 MIN: CPT | Performed by: FAMILY MEDICINE

## 2022-01-17 RX ORDER — SIMVASTATIN 10 MG
10 TABLET ORAL EVERY EVENING
Qty: 90 TABLET | Refills: 3 | Status: SHIPPED | OUTPATIENT
Start: 2022-01-17 | End: 2023-03-13

## 2022-01-17 RX ORDER — BUSPIRONE HYDROCHLORIDE 15 MG/1
15 TABLET ORAL 2 TIMES DAILY
Qty: 180 TABLET | Refills: 1 | Status: SHIPPED | OUTPATIENT
Start: 2022-01-17 | End: 2022-12-12

## 2022-01-17 ASSESSMENT — ENCOUNTER SYMPTOMS
MUSCULOSKELETAL NEGATIVE: 1
CHILLS: 0
HEARTBURN: 0
HEMOPTYSIS: 0
BRUISES/BLEEDS EASILY: 0
DOUBLE VISION: 0
HEADACHES: 0
DEPRESSION: 0
GASTROINTESTINAL NEGATIVE: 1
NAUSEA: 0
PALPITATIONS: 0
NEUROLOGICAL NEGATIVE: 1
DIZZINESS: 0
BLURRED VISION: 0
EYES NEGATIVE: 1
MYALGIAS: 0
PSYCHIATRIC NEGATIVE: 1
COUGH: 0
CONSTITUTIONAL NEGATIVE: 1
CARDIOVASCULAR NEGATIVE: 1
RESPIRATORY NEGATIVE: 1
TINGLING: 0
FEVER: 0

## 2022-01-17 ASSESSMENT — FIBROSIS 4 INDEX: FIB4 SCORE: 0.73

## 2022-01-17 ASSESSMENT — PATIENT HEALTH QUESTIONNAIRE - PHQ9: CLINICAL INTERPRETATION OF PHQ2 SCORE: 0

## 2022-01-17 NOTE — PROGRESS NOTES
Subjective     Sebastian Asif is a 51 y.o. male who presents with Medication Refill            1. Hyperlipidemia, unspecified hyperlipidemia type  Currently treated for HLD, taking meds with no new myalgias or joint pain, watching fats in diet  controlled     simvastatin (ZOCOR) 10 MG Tab; Take 1 Tablet by mouth every evening.  Dispense: 90 Tablet; Refill: 3    2. Tobacco abuse  Patient is still currently using tobacco. They were counseled on the importance of cessation and various behavioural and pharmacological ways of achieving this.  UNCONTROLLED     busPIRone (BUSPAR) 15 MG tablet; Take 1 Tablet by mouth 2 times a day.  Dispense: 180 Tablet; Refill: 1    3. Chest tightness     fluticasone-salmeterol (ADVAIR DISKUS) 250-50 MCG/DOSE AEROSOL POWDER, BREATH ACTIVATED; TAKE 1 PUFF BY MOUTH TWICE A DAY *RINSE MOUTH AFTER USE*  Dispense: 180 Each; Refill: 1    4. Screening for colorectal cancer     Referral to GI for Colonoscopy    Past Medical History:  2014: Elevated cholesterol      Comment:  was taking fish oil.  last labs 5 months ago  Past Surgical History:  No date: KNEE ARTHROSCOPY; Left  No date: OTHER      Comment:  TEste procedure for blockage repair  Social History    Tobacco Use      Smoking status: Current Every Day Smoker        Types: Cigars      Smokeless tobacco: Never Used    Vaping Use      Vaping Use: Never used    Alcohol use: No      Alcohol/week: 0.0 oz    Drug use: No    Review of patient's family history indicates:  Problem: Cancer      Relation: Mother          Age of Onset: (Not Specified)          Comment: unknown area  Problem: Heart Disease      Relation: Father          Age of Onset: (Not Specified)      Current Outpatient Medications: •  busPIRone (BUSPAR) 15 MG tablet, Take 1 Tablet by mouth 2 times a day., Disp: 180 Tablet, Rfl: 1•  fluticasone-salmeterol (ADVAIR DISKUS) 250-50 MCG/DOSE AEROSOL POWDER, BREATH ACTIVATED, TAKE 1 PUFF BY MOUTH TWICE A DAY *RINSE MOUTH AFTER  "USE*, Disp: 180 Each, Rfl: 1•  simvastatin (ZOCOR) 10 MG Tab, Take 1 Tablet by mouth every evening., Disp: 90 Tablet, Rfl: 3•  albuterol 108 (90 Base) MCG/ACT Aero Soln inhalation aerosol, Inhale 2 Puffs by mouth every 6 hours as needed for Shortness of Breath., Disp: , Rfl:     Patient was instructed on the use of medications, either prescriptions or OTC and informed on when the appropriate follow up time period should be. In addition, patient was also instructed that should any acute worsening occur that they should notify this clinic asap or call 911.          Review of Systems   Constitutional: Negative.  Negative for chills and fever.   HENT: Negative.  Negative for hearing loss.    Eyes: Negative.  Negative for blurred vision and double vision.   Respiratory: Negative.  Negative for cough and hemoptysis.    Cardiovascular: Negative.  Negative for chest pain and palpitations.   Gastrointestinal: Negative.  Negative for heartburn and nausea.   Genitourinary: Negative.  Negative for dysuria.   Musculoskeletal: Negative.  Negative for myalgias.   Skin: Negative.  Negative for rash.   Neurological: Negative.  Negative for dizziness, tingling and headaches.   Endo/Heme/Allergies: Negative.  Does not bruise/bleed easily.   Psychiatric/Behavioral: Negative.  Negative for depression and suicidal ideas.   All other systems reviewed and are negative.             Objective     /82 (BP Location: Left arm, Patient Position: Sitting, BP Cuff Size: Adult)   Pulse 81   Temp 36.8 °C (98.2 °F) (Temporal)   Resp 12   Ht 1.88 m (6' 2\")   Wt 95.3 kg (210 lb)   SpO2 96%   BMI 26.96 kg/m²      Physical Exam  Vitals and nursing note reviewed.   Constitutional:       General: He is not in acute distress.     Appearance: He is well-developed. He is not diaphoretic.   HENT:      Head: Normocephalic and atraumatic.      Mouth/Throat:      Pharynx: No oropharyngeal exudate.   Eyes:      Pupils: Pupils are equal, round, and " reactive to light.   Cardiovascular:      Rate and Rhythm: Normal rate and regular rhythm.      Heart sounds: Normal heart sounds. No murmur heard.  No friction rub. No gallop.    Pulmonary:      Effort: Pulmonary effort is normal. No respiratory distress.      Breath sounds: Normal breath sounds. No wheezing or rales.   Chest:      Chest wall: No tenderness.   Neurological:      Mental Status: He is alert and oriented to person, place, and time.   Psychiatric:         Behavior: Behavior normal.         Thought Content: Thought content normal.         Judgment: Judgment normal.                             Assessment & Plan        1. Hyperlipidemia, unspecified hyperlipidemia type    - simvastatin (ZOCOR) 10 MG Tab; Take 1 Tablet by mouth every evening.  Dispense: 90 Tablet; Refill: 3    2. Tobacco abuse    - busPIRone (BUSPAR) 15 MG tablet; Take 1 Tablet by mouth 2 times a day.  Dispense: 180 Tablet; Refill: 1    3. Chest tightness    - fluticasone-salmeterol (ADVAIR DISKUS) 250-50 MCG/DOSE AEROSOL POWDER, BREATH ACTIVATED; TAKE 1 PUFF BY MOUTH TWICE A DAY *RINSE MOUTH AFTER USE*  Dispense: 180 Each; Refill: 1    4. Screening for colorectal cancer    - Referral to GI for Colonoscopy

## 2022-01-17 NOTE — LETTER
[facility]  Isreal Mcknight M.D.  [address] [facility] [address]  [facility] [address]  Fax: [phone]   Authorization for Release/Disclosure of   Protected Health Information   Name: PARIS ASIF : 1970 SSN: [id number]   Address: [address] [place] [id number] Phone:    There are no phone numbers on file.   I authorize the entity listed below to release/disclose the PHI below to:   [facility]/Isreal Mcknight M.D. and Isreal Mcknight M.D.   Provider or Entity Name:     Address   City, State, [facility]   Phone:      Fax:     Reason for request: continuity of care   Information to be released:    [  ] LAST COLONOSCOPY,  including any PATH REPORT and follow-up  [  ] LAST FIT/COLOGUARD RESULT [  ] LAST DEXA  [  ] LAST MAMMOGRAM  [  ] LAST PAP  [  ] LAST LABS [  ] RETINA EXAM REPORT  [  ] IMMUNIZATION RECORDS  [  ] Release all info      [  ] Check here and initial the line next to each item to release ALL health information INCLUDING  _____ Care and treatment for drug and / or alcohol abuse  _____ HIV testing, infection status, or AIDS  _____ Genetic Testing    DATES OF SERVICE OR TIME PERIOD TO BE DISCLOSED: _____________  I understand and acknowledge that:  * This Authorization may be revoked at any time by you in writing, except if your health information has already been used or disclosed.  * Your health information that will be used or disclosed as a result of you signing this authorization could be re-disclosed by the recipient. If this occurs, your re-disclosed health information may no longer be protected by State or Federal laws.  * You may refuse to sign this Authorization. Your refusal will not affect your ability to obtain treatment.  * This Authorization becomes effective upon signing and will  on (date) __________.      If no date is indicated, this Authorization will  one (1) year from the signature date.    Name: Paris Asif    Signature:   Date:      1/17/2022       PLEASE FAX REQUESTED RECORDS BACK TO: [phone]

## 2022-12-10 DIAGNOSIS — R07.89 CHEST TIGHTNESS: ICD-10-CM

## 2022-12-10 DIAGNOSIS — Z72.0 TOBACCO ABUSE: ICD-10-CM

## 2022-12-12 RX ORDER — BUSPIRONE HYDROCHLORIDE 15 MG/1
15 TABLET ORAL 2 TIMES DAILY
Qty: 180 TABLET | Refills: 1 | Status: SHIPPED | OUTPATIENT
Start: 2022-12-12 | End: 2023-06-08

## 2022-12-12 RX ORDER — FLUTICASONE PROPIONATE AND SALMETEROL 250; 50 UG/1; UG/1
POWDER RESPIRATORY (INHALATION)
Qty: 180 EACH | Refills: 1 | Status: SHIPPED | OUTPATIENT
Start: 2022-12-12 | End: 2023-06-08

## 2023-03-11 DIAGNOSIS — E78.5 HYPERLIPIDEMIA, UNSPECIFIED HYPERLIPIDEMIA TYPE: ICD-10-CM

## 2023-03-13 RX ORDER — SIMVASTATIN 10 MG
10 TABLET ORAL EVERY EVENING
Qty: 90 TABLET | Refills: 3 | Status: SHIPPED | OUTPATIENT
Start: 2023-03-13 | End: 2024-03-19 | Stop reason: SDUPTHER

## 2023-06-08 DIAGNOSIS — R07.89 CHEST TIGHTNESS: ICD-10-CM

## 2023-06-08 DIAGNOSIS — Z72.0 TOBACCO ABUSE: ICD-10-CM

## 2023-06-08 RX ORDER — BUSPIRONE HYDROCHLORIDE 15 MG/1
TABLET ORAL
Qty: 180 TABLET | Refills: 1 | Status: SHIPPED | OUTPATIENT
Start: 2023-06-08

## 2023-06-08 RX ORDER — FLUTICASONE PROPIONATE AND SALMETEROL 250; 50 UG/1; UG/1
POWDER RESPIRATORY (INHALATION)
Qty: 180 EACH | Refills: 1 | Status: SHIPPED | OUTPATIENT
Start: 2023-06-08

## 2024-03-19 DIAGNOSIS — E78.5 HYPERLIPIDEMIA, UNSPECIFIED HYPERLIPIDEMIA TYPE: ICD-10-CM

## 2024-03-20 RX ORDER — SIMVASTATIN 10 MG
10 TABLET ORAL EVERY EVENING
Qty: 90 TABLET | Refills: 3 | Status: SHIPPED | OUTPATIENT
Start: 2024-03-20

## 2024-07-17 ENCOUNTER — NON-PROVIDER VISIT (OUTPATIENT)
Dept: URGENT CARE | Facility: PHYSICIAN GROUP | Age: 54
End: 2024-07-17

## 2024-07-17 DIAGNOSIS — Z02.83 ENCOUNTER FOR EMPLOYMENT-RELATED DRUG TESTING: ICD-10-CM

## 2024-07-17 PROCEDURE — 8907 PR URINE COLLECT ONLY

## 2024-10-27 SDOH — ECONOMIC STABILITY: INCOME INSECURITY: HOW HARD IS IT FOR YOU TO PAY FOR THE VERY BASICS LIKE FOOD, HOUSING, MEDICAL CARE, AND HEATING?: PATIENT DECLINED

## 2024-10-27 SDOH — ECONOMIC STABILITY: INCOME INSECURITY: IN THE LAST 12 MONTHS, WAS THERE A TIME WHEN YOU WERE NOT ABLE TO PAY THE MORTGAGE OR RENT ON TIME?: PATIENT DECLINED

## 2024-10-27 SDOH — ECONOMIC STABILITY: TRANSPORTATION INSECURITY
IN THE PAST 12 MONTHS, HAS THE LACK OF TRANSPORTATION KEPT YOU FROM MEDICAL APPOINTMENTS OR FROM GETTING MEDICATIONS?: PATIENT DECLINED

## 2024-10-27 SDOH — HEALTH STABILITY: PHYSICAL HEALTH
ON AVERAGE, HOW MANY DAYS PER WEEK DO YOU ENGAGE IN MODERATE TO STRENUOUS EXERCISE (LIKE A BRISK WALK)?: PATIENT DECLINED

## 2024-10-27 SDOH — ECONOMIC STABILITY: FOOD INSECURITY: WITHIN THE PAST 12 MONTHS, THE FOOD YOU BOUGHT JUST DIDN'T LAST AND YOU DIDN'T HAVE MONEY TO GET MORE.: PATIENT DECLINED

## 2024-10-27 SDOH — ECONOMIC STABILITY: FOOD INSECURITY: WITHIN THE PAST 12 MONTHS, YOU WORRIED THAT YOUR FOOD WOULD RUN OUT BEFORE YOU GOT MONEY TO BUY MORE.: PATIENT DECLINED

## 2024-10-27 SDOH — HEALTH STABILITY: PHYSICAL HEALTH: ON AVERAGE, HOW MANY MINUTES DO YOU ENGAGE IN EXERCISE AT THIS LEVEL?: PATIENT DECLINED

## 2024-10-27 SDOH — ECONOMIC STABILITY: TRANSPORTATION INSECURITY
IN THE PAST 12 MONTHS, HAS LACK OF RELIABLE TRANSPORTATION KEPT YOU FROM MEDICAL APPOINTMENTS, MEETINGS, WORK OR FROM GETTING THINGS NEEDED FOR DAILY LIVING?: PATIENT DECLINED

## 2024-10-27 SDOH — ECONOMIC STABILITY: TRANSPORTATION INSECURITY
IN THE PAST 12 MONTHS, HAS LACK OF TRANSPORTATION KEPT YOU FROM MEETINGS, WORK, OR FROM GETTING THINGS NEEDED FOR DAILY LIVING?: PATIENT DECLINED

## 2024-10-27 SDOH — ECONOMIC STABILITY: HOUSING INSECURITY
IN THE LAST 12 MONTHS, WAS THERE A TIME WHEN YOU DID NOT HAVE A STEADY PLACE TO SLEEP OR SLEPT IN A SHELTER (INCLUDING NOW)?: PATIENT DECLINED

## 2024-10-27 SDOH — HEALTH STABILITY: MENTAL HEALTH
STRESS IS WHEN SOMEONE FEELS TENSE, NERVOUS, ANXIOUS, OR CAN'T SLEEP AT NIGHT BECAUSE THEIR MIND IS TROUBLED. HOW STRESSED ARE YOU?: PATIENT DECLINED

## 2024-10-27 ASSESSMENT — SOCIAL DETERMINANTS OF HEALTH (SDOH)
HOW OFTEN DO YOU GET TOGETHER WITH FRIENDS OR RELATIVES?: PATIENT DECLINED
HOW OFTEN DO YOU ATTEND CHURCH OR RELIGIOUS SERVICES?: PATIENT DECLINED
ARE YOU MARRIED, WIDOWED, DIVORCED, SEPARATED, NEVER MARRIED, OR LIVING WITH A PARTNER?: PATIENT DECLINED
DO YOU BELONG TO ANY CLUBS OR ORGANIZATIONS SUCH AS CHURCH GROUPS UNIONS, FRATERNAL OR ATHLETIC GROUPS, OR SCHOOL GROUPS?: YES
HOW OFTEN DO YOU GET TOGETHER WITH FRIENDS OR RELATIVES?: PATIENT DECLINED
HOW OFTEN DO YOU ATTENT MEETINGS OF THE CLUB OR ORGANIZATION YOU BELONG TO?: PATIENT DECLINED
HOW OFTEN DO YOU ATTENT MEETINGS OF THE CLUB OR ORGANIZATION YOU BELONG TO?: PATIENT DECLINED
HOW MANY DRINKS CONTAINING ALCOHOL DO YOU HAVE ON A TYPICAL DAY WHEN YOU ARE DRINKING: PATIENT DECLINED
HOW OFTEN DO YOU ATTEND CHURCH OR RELIGIOUS SERVICES?: PATIENT DECLINED
IN THE PAST 12 MONTHS, HAS THE ELECTRIC, GAS, OIL, OR WATER COMPANY THREATENED TO SHUT OFF SERVICE IN YOUR HOME?: PATIENT DECLINED
IN A TYPICAL WEEK, HOW MANY TIMES DO YOU TALK ON THE PHONE WITH FAMILY, FRIENDS, OR NEIGHBORS?: PATIENT DECLINED
IN A TYPICAL WEEK, HOW MANY TIMES DO YOU TALK ON THE PHONE WITH FAMILY, FRIENDS, OR NEIGHBORS?: PATIENT DECLINED
HOW OFTEN DO YOU ATTENT MEETINGS OF THE CLUB OR ORGANIZATION YOU BELONG TO?: PATIENT DECLINED
HOW OFTEN DO YOU ATTEND CHURCH OR RELIGIOUS SERVICES?: PATIENT DECLINED
IN A TYPICAL WEEK, HOW MANY TIMES DO YOU TALK ON THE PHONE WITH FAMILY, FRIENDS, OR NEIGHBORS?: PATIENT DECLINED
IN THE PAST 12 MONTHS, HAS THE ELECTRIC, GAS, OIL, OR WATER COMPANY THREATENED TO SHUT OFF SERVICE IN YOUR HOME?: PATIENT DECLINED
ARE YOU MARRIED, WIDOWED, DIVORCED, SEPARATED, NEVER MARRIED, OR LIVING WITH A PARTNER?: PATIENT DECLINED
IN THE PAST 12 MONTHS, HAS THE ELECTRIC, GAS, OIL, OR WATER COMPANY THREATENED TO SHUT OFF SERVICE IN YOUR HOME?: PATIENT DECLINED
ARE YOU MARRIED, WIDOWED, DIVORCED, SEPARATED, NEVER MARRIED, OR LIVING WITH A PARTNER?: PATIENT DECLINED
HOW HARD IS IT FOR YOU TO PAY FOR THE VERY BASICS LIKE FOOD, HOUSING, MEDICAL CARE, AND HEATING?: PATIENT DECLINED
HOW OFTEN DO YOU GET TOGETHER WITH FRIENDS OR RELATIVES?: PATIENT DECLINED
HOW OFTEN DO YOU GET TOGETHER WITH FRIENDS OR RELATIVES?: PATIENT DECLINED
DO YOU BELONG TO ANY CLUBS OR ORGANIZATIONS SUCH AS CHURCH GROUPS UNIONS, FRATERNAL OR ATHLETIC GROUPS, OR SCHOOL GROUPS?: YES
HOW OFTEN DO YOU ATTENT MEETINGS OF THE CLUB OR ORGANIZATION YOU BELONG TO?: PATIENT DECLINED
IN A TYPICAL WEEK, HOW MANY TIMES DO YOU TALK ON THE PHONE WITH FAMILY, FRIENDS, OR NEIGHBORS?: PATIENT DECLINED
DO YOU BELONG TO ANY CLUBS OR ORGANIZATIONS SUCH AS CHURCH GROUPS UNIONS, FRATERNAL OR ATHLETIC GROUPS, OR SCHOOL GROUPS?: YES
DO YOU BELONG TO ANY CLUBS OR ORGANIZATIONS SUCH AS CHURCH GROUPS UNIONS, FRATERNAL OR ATHLETIC GROUPS, OR SCHOOL GROUPS?: YES
HOW OFTEN DO YOU GET TOGETHER WITH FRIENDS OR RELATIVES?: PATIENT DECLINED
IN THE PAST 12 MONTHS, HAS THE ELECTRIC, GAS, OIL, OR WATER COMPANY THREATENED TO SHUT OFF SERVICE IN YOUR HOME?: PATIENT DECLINED
WITHIN THE PAST 12 MONTHS, YOU WORRIED THAT YOUR FOOD WOULD RUN OUT BEFORE YOU GOT THE MONEY TO BUY MORE: PATIENT DECLINED
HOW OFTEN DO YOU ATTEND CHURCH OR RELIGIOUS SERVICES?: PATIENT DECLINED
DO YOU BELONG TO ANY CLUBS OR ORGANIZATIONS SUCH AS CHURCH GROUPS UNIONS, FRATERNAL OR ATHLETIC GROUPS, OR SCHOOL GROUPS?: YES
HOW OFTEN DO YOU HAVE SIX OR MORE DRINKS ON ONE OCCASION: PATIENT DECLINED
HOW OFTEN DO YOU ATTENT MEETINGS OF THE CLUB OR ORGANIZATION YOU BELONG TO?: PATIENT DECLINED
IN A TYPICAL WEEK, HOW MANY TIMES DO YOU TALK ON THE PHONE WITH FAMILY, FRIENDS, OR NEIGHBORS?: PATIENT DECLINED
HOW OFTEN DO YOU HAVE A DRINK CONTAINING ALCOHOL: PATIENT DECLINED
HOW OFTEN DO YOU ATTEND CHURCH OR RELIGIOUS SERVICES?: PATIENT DECLINED

## 2024-10-27 ASSESSMENT — LIFESTYLE VARIABLES
HOW OFTEN DO YOU HAVE SIX OR MORE DRINKS ON ONE OCCASION: PATIENT DECLINED
HOW MANY STANDARD DRINKS CONTAINING ALCOHOL DO YOU HAVE ON A TYPICAL DAY: PATIENT DECLINED
HOW OFTEN DO YOU HAVE A DRINK CONTAINING ALCOHOL: PATIENT DECLINED
AUDIT-C TOTAL SCORE: -1
SKIP TO QUESTIONS 9-10: 0
AUDIT-C TOTAL SCORE: -1
SKIP TO QUESTIONS 9-10: 0
AUDIT-C TOTAL SCORE: -1
HOW MANY STANDARD DRINKS CONTAINING ALCOHOL DO YOU HAVE ON A TYPICAL DAY: PATIENT DECLINED
SKIP TO QUESTIONS 9-10: 0
AUDIT-C TOTAL SCORE: -1
SKIP TO QUESTIONS 9-10: 0
HOW OFTEN DO YOU HAVE SIX OR MORE DRINKS ON ONE OCCASION: PATIENT DECLINED
HOW OFTEN DO YOU HAVE SIX OR MORE DRINKS ON ONE OCCASION: PATIENT DECLINED
HOW OFTEN DO YOU HAVE A DRINK CONTAINING ALCOHOL: PATIENT DECLINED
HOW MANY STANDARD DRINKS CONTAINING ALCOHOL DO YOU HAVE ON A TYPICAL DAY: PATIENT DECLINED
HOW OFTEN DO YOU HAVE SIX OR MORE DRINKS ON ONE OCCASION: PATIENT DECLINED
HOW MANY STANDARD DRINKS CONTAINING ALCOHOL DO YOU HAVE ON A TYPICAL DAY: PATIENT DECLINED

## 2024-10-28 SDOH — HEALTH STABILITY: PHYSICAL HEALTH: ON AVERAGE, HOW MANY MINUTES DO YOU ENGAGE IN EXERCISE AT THIS LEVEL?: PATIENT DECLINED

## 2024-10-28 ASSESSMENT — SOCIAL DETERMINANTS OF HEALTH (SDOH)
IN A TYPICAL WEEK, HOW MANY TIMES DO YOU TALK ON THE PHONE WITH FAMILY, FRIENDS, OR NEIGHBORS?: PATIENT DECLINED
HOW HARD IS IT FOR YOU TO PAY FOR THE VERY BASICS LIKE FOOD, HOUSING, MEDICAL CARE, AND HEATING?: PATIENT DECLINED
ARE YOU MARRIED, WIDOWED, DIVORCED, SEPARATED, NEVER MARRIED, OR LIVING WITH A PARTNER?: PATIENT DECLINED
HOW OFTEN DO YOU GET TOGETHER WITH FRIENDS OR RELATIVES?: PATIENT DECLINED
HOW OFTEN DO YOU HAVE SIX OR MORE DRINKS ON ONE OCCASION: PATIENT DECLINED
HOW OFTEN DO YOU ATTEND CHURCH OR RELIGIOUS SERVICES?: PATIENT DECLINED
HOW OFTEN DO YOU ATTENT MEETINGS OF THE CLUB OR ORGANIZATION YOU BELONG TO?: PATIENT DECLINED
WITHIN THE PAST 12 MONTHS, YOU WORRIED THAT YOUR FOOD WOULD RUN OUT BEFORE YOU GOT THE MONEY TO BUY MORE: PATIENT DECLINED
HOW MANY DRINKS CONTAINING ALCOHOL DO YOU HAVE ON A TYPICAL DAY WHEN YOU ARE DRINKING: PATIENT DECLINED
HOW OFTEN DO YOU HAVE A DRINK CONTAINING ALCOHOL: PATIENT DECLINED
DO YOU BELONG TO ANY CLUBS OR ORGANIZATIONS SUCH AS CHURCH GROUPS UNIONS, FRATERNAL OR ATHLETIC GROUPS, OR SCHOOL GROUPS?: YES

## 2024-10-30 ENCOUNTER — APPOINTMENT (OUTPATIENT)
Dept: MEDICAL GROUP | Facility: PHYSICIAN GROUP | Age: 54
End: 2024-10-30
Payer: COMMERCIAL

## 2024-11-06 SDOH — HEALTH STABILITY: PHYSICAL HEALTH: ON AVERAGE, HOW MANY MINUTES DO YOU ENGAGE IN EXERCISE AT THIS LEVEL?: PATIENT DECLINED

## 2024-11-06 ASSESSMENT — SOCIAL DETERMINANTS OF HEALTH (SDOH)
HOW OFTEN DO YOU ATTENT MEETINGS OF THE CLUB OR ORGANIZATION YOU BELONG TO?: PATIENT DECLINED
ARE YOU MARRIED, WIDOWED, DIVORCED, SEPARATED, NEVER MARRIED, OR LIVING WITH A PARTNER?: PATIENT DECLINED
HOW OFTEN DO YOU HAVE SIX OR MORE DRINKS ON ONE OCCASION: PATIENT DECLINED
WITHIN THE PAST 12 MONTHS, YOU WORRIED THAT YOUR FOOD WOULD RUN OUT BEFORE YOU GOT THE MONEY TO BUY MORE: PATIENT DECLINED
HOW OFTEN DO YOU GET TOGETHER WITH FRIENDS OR RELATIVES?: PATIENT DECLINED
HOW MANY DRINKS CONTAINING ALCOHOL DO YOU HAVE ON A TYPICAL DAY WHEN YOU ARE DRINKING: PATIENT DECLINED
HOW OFTEN DO YOU ATTEND CHURCH OR RELIGIOUS SERVICES?: PATIENT DECLINED
DO YOU BELONG TO ANY CLUBS OR ORGANIZATIONS SUCH AS CHURCH GROUPS UNIONS, FRATERNAL OR ATHLETIC GROUPS, OR SCHOOL GROUPS?: YES
IN A TYPICAL WEEK, HOW MANY TIMES DO YOU TALK ON THE PHONE WITH FAMILY, FRIENDS, OR NEIGHBORS?: PATIENT DECLINED
HOW OFTEN DO YOU HAVE A DRINK CONTAINING ALCOHOL: PATIENT DECLINED
HOW HARD IS IT FOR YOU TO PAY FOR THE VERY BASICS LIKE FOOD, HOUSING, MEDICAL CARE, AND HEATING?: PATIENT DECLINED

## 2024-11-13 ENCOUNTER — APPOINTMENT (OUTPATIENT)
Dept: MEDICAL GROUP | Facility: PHYSICIAN GROUP | Age: 54
End: 2024-11-13
Payer: COMMERCIAL

## 2024-11-26 SDOH — HEALTH STABILITY: PHYSICAL HEALTH: ON AVERAGE, HOW MANY MINUTES DO YOU ENGAGE IN EXERCISE AT THIS LEVEL?: PATIENT DECLINED

## 2024-11-26 SDOH — ECONOMIC STABILITY: INCOME INSECURITY: HOW HARD IS IT FOR YOU TO PAY FOR THE VERY BASICS LIKE FOOD, HOUSING, MEDICAL CARE, AND HEATING?: PATIENT DECLINED

## 2024-11-26 SDOH — ECONOMIC STABILITY: INCOME INSECURITY: IN THE LAST 12 MONTHS, WAS THERE A TIME WHEN YOU WERE NOT ABLE TO PAY THE MORTGAGE OR RENT ON TIME?: PATIENT DECLINED

## 2024-11-26 SDOH — ECONOMIC STABILITY: FOOD INSECURITY: WITHIN THE PAST 12 MONTHS, YOU WORRIED THAT YOUR FOOD WOULD RUN OUT BEFORE YOU GOT MONEY TO BUY MORE.: PATIENT DECLINED

## 2024-11-26 SDOH — ECONOMIC STABILITY: FOOD INSECURITY: WITHIN THE PAST 12 MONTHS, THE FOOD YOU BOUGHT JUST DIDN'T LAST AND YOU DIDN'T HAVE MONEY TO GET MORE.: PATIENT DECLINED

## 2024-11-26 ASSESSMENT — SOCIAL DETERMINANTS OF HEALTH (SDOH)
ARE YOU MARRIED, WIDOWED, DIVORCED, SEPARATED, NEVER MARRIED, OR LIVING WITH A PARTNER?: PATIENT DECLINED
HOW OFTEN DO YOU ATTEND CHURCH OR RELIGIOUS SERVICES?: PATIENT DECLINED
HOW OFTEN DO YOU HAVE SIX OR MORE DRINKS ON ONE OCCASION: PATIENT DECLINED
HOW MANY DRINKS CONTAINING ALCOHOL DO YOU HAVE ON A TYPICAL DAY WHEN YOU ARE DRINKING: PATIENT DECLINED
HOW OFTEN DO YOU ATTEND CHURCH OR RELIGIOUS SERVICES?: PATIENT DECLINED
WITHIN THE PAST 12 MONTHS, YOU WORRIED THAT YOUR FOOD WOULD RUN OUT BEFORE YOU GOT THE MONEY TO BUY MORE: PATIENT DECLINED
HOW OFTEN DO YOU GET TOGETHER WITH FRIENDS OR RELATIVES?: PATIENT DECLINED
IN THE PAST 12 MONTHS, HAS THE ELECTRIC, GAS, OIL, OR WATER COMPANY THREATENED TO SHUT OFF SERVICE IN YOUR HOME?: PATIENT DECLINED
ARE YOU MARRIED, WIDOWED, DIVORCED, SEPARATED, NEVER MARRIED, OR LIVING WITH A PARTNER?: PATIENT DECLINED
HOW HARD IS IT FOR YOU TO PAY FOR THE VERY BASICS LIKE FOOD, HOUSING, MEDICAL CARE, AND HEATING?: PATIENT DECLINED
HOW OFTEN DO YOU HAVE A DRINK CONTAINING ALCOHOL: PATIENT DECLINED
HOW OFTEN DO YOU ATTENT MEETINGS OF THE CLUB OR ORGANIZATION YOU BELONG TO?: PATIENT DECLINED
DO YOU BELONG TO ANY CLUBS OR ORGANIZATIONS SUCH AS CHURCH GROUPS UNIONS, FRATERNAL OR ATHLETIC GROUPS, OR SCHOOL GROUPS?: YES
HOW OFTEN DO YOU GET TOGETHER WITH FRIENDS OR RELATIVES?: PATIENT DECLINED
IN A TYPICAL WEEK, HOW MANY TIMES DO YOU TALK ON THE PHONE WITH FAMILY, FRIENDS, OR NEIGHBORS?: PATIENT DECLINED
HOW OFTEN DO YOU ATTENT MEETINGS OF THE CLUB OR ORGANIZATION YOU BELONG TO?: PATIENT DECLINED
IN A TYPICAL WEEK, HOW MANY TIMES DO YOU TALK ON THE PHONE WITH FAMILY, FRIENDS, OR NEIGHBORS?: PATIENT DECLINED
DO YOU BELONG TO ANY CLUBS OR ORGANIZATIONS SUCH AS CHURCH GROUPS UNIONS, FRATERNAL OR ATHLETIC GROUPS, OR SCHOOL GROUPS?: YES

## 2024-11-26 ASSESSMENT — LIFESTYLE VARIABLES
HOW MANY STANDARD DRINKS CONTAINING ALCOHOL DO YOU HAVE ON A TYPICAL DAY: PATIENT DECLINED
HOW OFTEN DO YOU HAVE A DRINK CONTAINING ALCOHOL: PATIENT DECLINED
SKIP TO QUESTIONS 9-10: 0
HOW OFTEN DO YOU HAVE SIX OR MORE DRINKS ON ONE OCCASION: PATIENT DECLINED
AUDIT-C TOTAL SCORE: -1

## 2024-11-27 ENCOUNTER — OFFICE VISIT (OUTPATIENT)
Dept: MEDICAL GROUP | Facility: PHYSICIAN GROUP | Age: 54
End: 2024-11-27
Payer: COMMERCIAL

## 2024-11-27 ENCOUNTER — APPOINTMENT (OUTPATIENT)
Dept: MEDICAL GROUP | Facility: PHYSICIAN GROUP | Age: 54
End: 2024-11-27
Payer: COMMERCIAL

## 2024-11-27 VITALS
BODY MASS INDEX: 26.82 KG/M2 | RESPIRATION RATE: 16 BRPM | OXYGEN SATURATION: 96 % | HEIGHT: 74 IN | HEART RATE: 88 BPM | TEMPERATURE: 98.2 F | SYSTOLIC BLOOD PRESSURE: 116 MMHG | WEIGHT: 209 LBS | DIASTOLIC BLOOD PRESSURE: 78 MMHG

## 2024-11-27 DIAGNOSIS — R07.89 CHEST TIGHTNESS: ICD-10-CM

## 2024-11-27 DIAGNOSIS — E78.5 HYPERLIPIDEMIA, UNSPECIFIED HYPERLIPIDEMIA TYPE: ICD-10-CM

## 2024-11-27 DIAGNOSIS — Z12.11 SCREENING FOR COLORECTAL CANCER: ICD-10-CM

## 2024-11-27 DIAGNOSIS — Z12.12 SCREENING FOR COLORECTAL CANCER: ICD-10-CM

## 2024-11-27 DIAGNOSIS — Z72.0 TOBACCO ABUSE: ICD-10-CM

## 2024-11-27 RX ORDER — ALBUTEROL SULFATE 90 UG/1
2 INHALANT RESPIRATORY (INHALATION) EVERY 6 HOURS PRN
Qty: 8.5 G | Refills: 3 | Status: SHIPPED | OUTPATIENT
Start: 2024-11-27

## 2024-11-27 RX ORDER — SIMVASTATIN 10 MG
10 TABLET ORAL EVERY EVENING
Qty: 90 TABLET | Refills: 3 | Status: SHIPPED | OUTPATIENT
Start: 2024-11-27

## 2024-11-27 RX ORDER — BUSPIRONE HYDROCHLORIDE 15 MG/1
15 TABLET ORAL 2 TIMES DAILY
Qty: 180 TABLET | Refills: 1 | Status: SHIPPED | OUTPATIENT
Start: 2024-11-27

## 2024-11-27 RX ORDER — FLUTICASONE PROPIONATE AND SALMETEROL 250; 50 UG/1; UG/1
POWDER RESPIRATORY (INHALATION)
Qty: 180 EACH | Refills: 1 | Status: SHIPPED | OUTPATIENT
Start: 2024-11-27

## 2024-11-27 ASSESSMENT — ENCOUNTER SYMPTOMS
RESPIRATORY NEGATIVE: 1
FEVER: 0
COUGH: 0
CONSTITUTIONAL NEGATIVE: 1
NAUSEA: 0
DIZZINESS: 0
NEUROLOGICAL NEGATIVE: 1
CHILLS: 0
HEMOPTYSIS: 0
CARDIOVASCULAR NEGATIVE: 1
DOUBLE VISION: 0
PSYCHIATRIC NEGATIVE: 1
MUSCULOSKELETAL NEGATIVE: 1
HEARTBURN: 0
GASTROINTESTINAL NEGATIVE: 1
TINGLING: 0
HEADACHES: 0
DEPRESSION: 0
BLURRED VISION: 0
BRUISES/BLEEDS EASILY: 0
MYALGIAS: 0
PALPITATIONS: 0
EYES NEGATIVE: 1

## 2024-11-27 ASSESSMENT — PATIENT HEALTH QUESTIONNAIRE - PHQ9: CLINICAL INTERPRETATION OF PHQ2 SCORE: 0

## 2024-11-27 NOTE — PROGRESS NOTES
Subjective     Sebastian Asif is a 54 y.o. male who presents with Annual Exam            1. Tobacco abuse  Patient is still currently using tobacco. They were counseled on the importance of cessation and various behavioural and pharmacological ways of achieving this.  UNCONTROLLED     busPIRone (BUSPAR) 15 MG tablet; Take 1 Tablet by mouth 2 times a day.  Dispense: 180 Tablet; Refill: 1    2. Chest tightness     fluticasone-salmeterol (ADVAIR DISKUS) 250-50 MCG/ACT AEROSOL POWDER, BREATH ACTIVATED; TAKE 1 PUFF BY MOUTH TWICE A DAY *RINSE MOUTH AFTER USE*  Dispense: 180 Each; Refill: 1    3. Hyperlipidemia, unspecified hyperlipidemia type  Currently treated for HLD, taking meds with no new myalgias or joint pain, watching fats in diet  controlled       simvastatin (ZOCOR) 10 MG Tab; Take 1 Tablet by mouth every evening.  Dispense: 90 Tablet; Refill: 3    4. Screening for colorectal cancer     Cologuard® colon cancer screening    Past Medical History:  2014: Elevated cholesterol      Comment:  was taking fish oil.  last labs 5 months ago  Past Surgical History:  No date: KNEE ARTHROSCOPY; Left  No date: OTHER      Comment:  TEste procedure for blockage repair  Social History    Tobacco Use      Smoking status: Every Day        Types: Cigars      Smokeless tobacco: Never    Vaping Use      Vaping status: Never Used    Alcohol use: No      Alcohol/week: 0.0 oz    Drug use: No    Review of patient's family history indicates:  Problem: Cancer      Relation: Mother          Age of Onset: (Not Specified)          Comment: unknown area  Problem: Heart Disease      Relation: Father          Age of Onset: (Not Specified)      Current Outpatient Medications: ·  albuterol 108 (90 Base) MCG/ACT Aero Soln inhalation aerosol, Inhale 2 Puffs every 6 hours as needed for Shortness of Breath., Disp: 8.5 g, Rfl: 3·  busPIRone (BUSPAR) 15 MG tablet, Take 1 Tablet by mouth 2 times a day., Disp: 180 Tablet, Rfl: 1·   "fluticasone-salmeterol (ADVAIR DISKUS) 250-50 MCG/ACT AEROSOL POWDER, BREATH ACTIVATED, TAKE 1 PUFF BY MOUTH TWICE A DAY *RINSE MOUTH AFTER USE*, Disp: 180 Each, Rfl: 1·  simvastatin (ZOCOR) 10 MG Tab, Take 1 Tablet by mouth every evening., Disp: 90 Tablet, Rfl: 3    Patient was instructed on the use of medications, either prescriptions or OTC and informed on when the appropriate follow up time period should be. In addition, patient was also instructed that should any acute worsening occur that they should notify this clinic asap or call 911.            Review of Systems   Constitutional: Negative.  Negative for chills and fever.   HENT: Negative.  Negative for hearing loss.    Eyes: Negative.  Negative for blurred vision and double vision.   Respiratory: Negative.  Negative for cough and hemoptysis.    Cardiovascular: Negative.  Negative for chest pain and palpitations.   Gastrointestinal: Negative.  Negative for heartburn and nausea.   Genitourinary: Negative.  Negative for dysuria.   Musculoskeletal: Negative.  Negative for myalgias.   Skin: Negative.  Negative for rash.   Neurological: Negative.  Negative for dizziness, tingling and headaches.   Endo/Heme/Allergies: Negative.  Does not bruise/bleed easily.   Psychiatric/Behavioral: Negative.  Negative for depression and suicidal ideas.    All other systems reviewed and are negative.             Objective     /78   Pulse 88   Temp 36.8 °C (98.2 °F) (Temporal)   Resp 16   Ht 1.88 m (6' 2\")   Wt 94.8 kg (209 lb)   SpO2 96%   BMI 26.83 kg/m²      Physical Exam  Vitals and nursing note reviewed.   Constitutional:       General: He is not in acute distress.     Appearance: He is well-developed. He is not diaphoretic.   HENT:      Head: Normocephalic and atraumatic.      Right Ear: External ear normal.      Left Ear: External ear normal.      Nose: Nose normal.      Mouth/Throat:      Pharynx: No oropharyngeal exudate.   Eyes:      General: No scleral " icterus.        Right eye: No discharge.         Left eye: No discharge.      Pupils: Pupils are equal, round, and reactive to light.   Neck:      Thyroid: No thyromegaly.      Vascular: No JVD.      Trachea: No tracheal deviation.   Cardiovascular:      Rate and Rhythm: Normal rate and regular rhythm.      Heart sounds: Normal heart sounds. No murmur heard.     No friction rub. No gallop.   Pulmonary:      Effort: Pulmonary effort is normal. No respiratory distress.      Breath sounds: Normal breath sounds. No stridor. No wheezing or rales.   Chest:      Chest wall: No tenderness.   Abdominal:      General: There is no distension.      Palpations: Abdomen is soft.      Tenderness: There is no abdominal tenderness.   Musculoskeletal:      Cervical back: Normal range of motion and neck supple.   Lymphadenopathy:      Cervical: No cervical adenopathy.   Neurological:      Mental Status: He is alert and oriented to person, place, and time.      Cranial Nerves: No cranial nerve deficit.   Psychiatric:         Behavior: Behavior normal.         Thought Content: Thought content normal.         Judgment: Judgment normal.                           Assessment & Plan        Assessment & Plan  Tobacco abuse    Orders:  •  busPIRone (BUSPAR) 15 MG tablet; Take 1 Tablet by mouth 2 times a day.    Chest tightness  The ASCVD Risk score (Beaufort DK, et al., 2019) failed to calculate for the following reasons:    Cannot find a previous HDL lab    Cannot find a previous total cholesterol lab      Orders:  •  fluticasone-salmeterol (ADVAIR DISKUS) 250-50 MCG/ACT AEROSOL POWDER, BREATH ACTIVATED; TAKE 1 PUFF BY MOUTH TWICE A DAY *RINSE MOUTH AFTER USE*    Hyperlipidemia, unspecified hyperlipidemia type    Orders:  •  simvastatin (ZOCOR) 10 MG Tab; Take 1 Tablet by mouth every evening.    Screening for colorectal cancer    Orders:  •  Cologuard® colon cancer screening

## 2025-01-04 NOTE — TELEPHONE ENCOUNTER
Received request via: Patient    Was the patient seen in the last year in this department? Yes    Does the patient have an active prescription (recently filled or refills available) for medication(s) requested? No      used

## 2025-02-07 DIAGNOSIS — E78.5 HYPERLIPIDEMIA, UNSPECIFIED HYPERLIPIDEMIA TYPE: ICD-10-CM

## 2025-02-07 NOTE — TELEPHONE ENCOUNTER
Received request via: Patient    Was the patient seen in the last year in this department? Yes    Does the patient have an active prescription (recently filled or refills available) for medication(s) requested? No    Pharmacy Name: [facility] - [place], [place] - [facility] [facility] [[id number]]     Does the patient have [facility] and need 100-day supply? (This applies to ALL medications) Patient does not have SCP

## 2025-02-10 RX ORDER — SIMVASTATIN 10 MG
10 TABLET ORAL EVERY EVENING
Qty: 90 TABLET | Refills: 3 | Status: SHIPPED | OUTPATIENT
Start: 2025-02-10

## 2025-02-25 LAB — NONINV COLON CA DNA+OCC BLD SCRN STL QL: NEGATIVE
